# Patient Record
Sex: MALE | Race: WHITE | NOT HISPANIC OR LATINO | Employment: FULL TIME | URBAN - METROPOLITAN AREA
[De-identification: names, ages, dates, MRNs, and addresses within clinical notes are randomized per-mention and may not be internally consistent; named-entity substitution may affect disease eponyms.]

---

## 2017-02-02 ENCOUNTER — PRE VISIT (OUTPATIENT)
Dept: GASTROENTEROLOGY | Facility: CLINIC | Age: 67
End: 2017-02-02

## 2017-02-02 NOTE — TELEPHONE ENCOUNTER
1.  Date/reason for appt: 2/6/17 11:20AM Digestive Issues   2.  Referring provider: Prabhakar Bernal MD   3.  Call to patient (Yes / No - short description): Yes, LM for pt to return my call if there are outside recs.  4.  Previous care at / records requested from:  [facility] - 10/28/16  PACS - XR Chest 2 VW 10/28/16

## 2017-02-06 ENCOUNTER — OFFICE VISIT (OUTPATIENT)
Dept: GASTROENTEROLOGY | Facility: CLINIC | Age: 67
End: 2017-02-06

## 2017-02-06 VITALS
WEIGHT: 143 LBS | SYSTOLIC BLOOD PRESSURE: 121 MMHG | DIASTOLIC BLOOD PRESSURE: 84 MMHG | HEART RATE: 97 BPM | BODY MASS INDEX: 22.44 KG/M2 | HEIGHT: 67 IN | OXYGEN SATURATION: 98 %

## 2017-02-06 DIAGNOSIS — K59.00 CONSTIPATION, UNSPECIFIED CONSTIPATION TYPE: Primary | ICD-10-CM

## 2017-02-06 ASSESSMENT — ENCOUNTER SYMPTOMS
JAUNDICE: 0
ABDOMINAL PAIN: 0
RECTAL BLEEDING: 0
BLOOD IN STOOL: 0
DIARRHEA: 0
RECTAL PAIN: 0
VOMITING: 0
BLOATING: 1
NAUSEA: 0
HEARTBURN: 0
CONSTIPATION: 1

## 2017-02-06 ASSESSMENT — PAIN SCALES - GENERAL: PAINLEVEL: NO PAIN (0)

## 2017-02-06 NOTE — MR AVS SNAPSHOT
After Visit Summary   2/6/2017    Maite Hale    MRN: [id number]           Patient Information     Date Of Birth          1950        Visit Information        Provider Department      2/6/2017 11:20 AM Leonard Thakkar MD [facility] Gastroenterology and IBD        Care Instructions    1. Give a trial to fiber powder. Citrucel is a good option but metamucil is ok too. Take 1 tablespoon daily. You can increase this to 2-3 tablespoons daily    2. Continue your probiotic. When it runs out you can try Culturelle or Align. You can get them over the counter.    3. Keep a food and symptom diary for 2 weeks to help identify a food trigger for symptoms    4. When traveling you can take miralax (laxative) to help when you get really constipated. You can take this on regular basis to to help you have 1-2 BMs per day    5. We will look for information on Kegel exercises and send to you on mychart    Follow up in 4-5 months or sooner if needed        Follow-ups after your visit        Your next 10 appointments already scheduled     Feb 13, 2017  3:30 PM   Office Visit with Prabhakar Bernal MD   [facility] ([facility])    [address] [place] [id number] [phone]           Bring a current list of meds and any records pertaining to this visit.  For Physicals, please bring immunization records and any forms needing to be filled out.  Please arrive 10 minutes early to complete paperwork.            May 09, 2017  8:40 AM   (Arrive by 8:25 AM)   Return Visit with Leonard Thakkar MD   [facility] ([facility])    [address] [place] [id number] [phone]              Who to contact     Please call your clinic at [phone] to:    Ask questions about your health    Make or cancel appointments    Discuss your medicines    Learn about your test results    Speak to your  "doctor   If you have compliments or concerns about an experience at your clinic, or if you wish to file a complaint, please contact [facility] Patient Relations at [phone] or email us at [email or web address]         Additional Information About Your Visit        MyChart Information     L[employer] gives you secure access to your electronic health record. If you see a primary care provider, you can also send messages to your care team and make appointments. If you have questions, please call your primary care clinic.  If you do not have a primary care provider, please call [phone] and they will assist you.      [employer] is an electronic gateway that provides easy, online access to your medical records. With [employer], you can request a clinic appointment, read your test results, renew a prescription or communicate with your care team.     To access your existing account, please contact your [facility] or call [phone] for assistance.        Care EveryWhere ID     This is your Care EveryWhere ID. This could be used by other organizations to access your [facility] medical records  [id number]        Your Vitals Were     Pulse Height BMI (Body Mass Index) Pulse Oximetry          97 1.702 m (5' 7\") 22.39 kg/m2 98%         Blood Pressure from Last 3 Encounters:   02/06/17 121/84   10/28/16 118/76   10/10/16 127/63    Weight from Last 3 Encounters:   02/06/17 64.864 kg (143 lb)   10/28/16 64.411 kg (142 lb)   10/10/16 65.363 kg (144 lb 1.6 oz)              Today, you had the following     No orders found for display       Primary Care Provider Office Phone # Fax #    [facility] Juan A Bernal [phone]       [facility] [address]  [facility] [address]        Thank you!     Thank you for choosing [facility]  for your care. Our goal is always to provide you with excellent care. " Hearing back from our patients is one way we can continue to improve our services. Please take a few minutes to complete the written survey that you may receive in the mail after your visit with us. Thank you!             Your Updated Medication List - Protect others around you: Learn how to safely use, store and throw away your medicines at www.disposemymeds.org.          This list is accurate as of: 2/6/17 12:21 PM.  Always use your most recent med list.                   Brand Name Dispense Instructions for use    MULTI COMPLETE PO      Take 1 capsule by mouth daily       SOLUBLE FIBER/PROBIOTICS PO      Take 1 capful by mouth daily

## 2017-02-06 NOTE — NURSING NOTE
"Chief Complaint   Patient presents with     Consult     Chronic constipation, bloating.       Filed Vitals:    02/06/17 1134   BP: 121/84   Pulse: 97   Height: 1.702 m (5' 7\")   Weight: 64.864 kg (143 lb)   SpO2: 98%       Body mass index is 22.39 kg/(m^2).                              "

## 2017-02-06 NOTE — PATIENT INSTRUCTIONS
1. Give a trial to fiber powder. Citrucel is a good option but metamucil is ok too. Take 1 tablespoon daily. You can increase this to 2-3 tablespoons daily    2. Continue your probiotic. When it runs out you can try Culturelle or Align. You can get them over the counter.    3. Keep a food and symptom diary for 2 weeks to help identify a food trigger for symptoms    4. When traveling you can take miralax (laxative) to help when you get really constipated. You can take this on regular basis to to help you have 1-2 BMs per day    5. We will look for information on Kegel exercises and send to you on shoplyt    Follow up in 4-5 months or sooner if needed

## 2017-02-06 NOTE — LETTER
2/6/2017       RE: Maite Hale  [address] [place] [address]     Dear Colleague,    Thank you for referring your patient, Maite Hale, to the [facility] GASTRO[facility]OLOGY AND IBD at [facility]. Please see a copy of my visit note below.    OUTPATIENT GI CONSULT NOTE      REFERRING PROVIDER:  Prabhakar Bernal.      REASON FOR CONSULTATION:  Constipation and bloating.      CHIEF COMPLAINT:  The same.      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      The patient notes that for many years he has had constipation, and in general this has been well managed by taking prunes in the morning.  He has noticed over the last several months, and particularly while traveling, that he has some worsening constipation.  He notes particularly that any time he gets off his schedule when he travels, the constipation becomes more significant.  He has had to take Ex-Lax occasionally, and with this will have some good results.  He notes that sometimes he has small amounts of more looser stool that just feels like things are note evacuating completely, and some days he just stops them completely, and will go multiple days without having a bowel movement  (usually when traveling).  He notes that this last fall, he had bronchitis and an upper respiratory tract infection, and was treated with some antibiotics and a probiotic.  While on the antibiotics, he actually had very gratifying bowel movements, and his bloating was much improved, although he has no significant risk factors for small intestinal bacterial overgrowth such as diabetes or surgeries on his GI tract.      The patient's weight has been stable.  He has no fevers, chills or sweats.  He has no blood in his stool.   He has no rashes, joint pains, mouth sores, eye pain or eye redness.      The patient does note that about twice a month he may get some symptoms of rectal spasms that wake him from sleep.  This will last for several minutes and then resolve.      REVIEW OF SYSTEMS:  A complete review of systems is performed.  Pertinent positives and negatives as stated above in the HPI.  The remainder of a complete review of systems is unremarkable.      PAST MEDICAL HISTORY:   1.  History of vitamin D deficiency.    2.  History of dyslipidemia.   3.  Chalazion of the upper eyelid.      PAST SURGICAL HISTORY:  He has a history of mesh placement and an inguinal hernia repair in his 20s.      FAMILY HISTORY:  Brother with a history of macular degeneration.  No family history of colon cancer or prostate cancer.  No history of colon polyps that he is aware of.  His father had diabetes and hypertension.  Sister, maternal grandmother and mother had Alzheimer's disease.  Father had cerebrovascular disease.  No history of Crohn's disease or ulcerative colitis.      SOCIAL HISTORY:  He is a professor at the [employer].  He does not smoke.  He does not drink alcohol.  He uses no other illicit drugs.  He is , and his wife is living in [place] for a year.      MEDICATIONS:  A multivitamin and probiotic.      DIET:  He is mainly a vegetarian, but eats rare white and red meat.  He usually eats a lot of the yogurt.  Typically, his wife makes the yogurt, but she has been living in [place], so he buys yogurt at the store.  He does eat quite a bit of feta cheese.      PHYSICAL EXAMINATION:   VITAL SIGNS:  Weight 143 pounds, height 5 feet 7 inches, blood pressure 121/84, pulse 98.7, satting 98% on room air.   GENERAL:  He is pleasant, in no acute distress.   HEENT:  Head is atraumatic, normocephalic.  Sclerae are anicteric without injection.  Oropharynx is clear with moist mucous membranes.   NECK:  Supple, with no  lymphadenopathy.   LUNGS:  He is breathing comfortably.   HEART:  Normal rate.   ABDOMEN:  Soft, nontender and nondistended, no rebound or guarding.   EXTREMITIES:  No clubbing, cyanosis or edema.   SKIN:  No evidence of rash.   JOINTS:  No evidence of synovitis.   NEUROLOGIC:  Awake, alert and oriented x3 with no focal deficits.      LABORATORY DATA:  Reviewed in Epic.  Basic metabolic panel, LFTs, and CBC with diff are normal.  Vitamin D is normal.  Vitamin B12 is normal.  TSH has been mildly elevated, but with a normal free T4.  PSA has been normal.  CRP was checked when he was having bronchitis.  It was mildly elevated at 10.4.      COLONOSCOPY:  He had a colonoscopy in 2012 by Dr. Velasco that showed an enlarged prostate and diverticulosis in the sigmoid colon, otherwise unremarkable.      ASSESSMENT AND PLAN:   Geoffrey is a pleasant gentleman with a several year history of constipation.     1.  Constipation.   Geoffrey is here today to discuss these symptoms.  He has done a good job of trying to keep fiber in his diet.  We did spend some time discussing the difference between soluble and insoluble fiber.  He is doing a good job of getting the insoluble fiber in his diet, but sometimes this is not enough.  I recommend that he take some soluble fiber in Citrucel or Metamucil.  He can start with 1 tablespoon a day in a glass of water.  He can increase this up to 3 tablespoons daily.  He can continue his probiotic.  He is taking a relatively expensive one.  Once this is done, he can switch over to Culturelle or Align.  We also talked about using MiraLax.  This is a better and safer option than using Ex-Lax.  He can take this as needed when he has not had a bowel movement for a day or two.  I definitely recommend that he take this when he travels (and any change from his normal routine) that results in worsening constipation.  He did have a question about why it seems that some of this is getting  worse over the course of the last year or 2.  I do not know if I have a great explanation for this, but we do know that as people get older sometimes their motility wanes, and they can get more constipated.  He has had a colonoscopy in , and I do not think we need to do this again in the absence of any other red flags.  I recommend that he continue taking the prunes in the morning, and sometimes it just takes a little more fiber and some MiraLax every once in a while.  I do note that sometimes he has difficulty with complete evacuation.  If we cannot manage this with fiber, miralax and probiotics, then I will consider sending [facility] to make sure there is no pelvic floor dysfunction that could benefit from biofeedback or physical therapy.  We will also look to get him some explanations for Kegel exercises.  We will talk with Maryuri Christine when she comes to the clinic later this week to see if she has any handouts that we can send him.  The patient has been checked with celiac blood work in the past, and we do not need to do this again.  He will keep a food and symptom diary to see if he can identify any food triggers, and he will avoid any food triggers that he identifies.  He can work to avoid milk products to see if this helps change his symptoms as well.  I recommend that he continue to be hydrated, and continue his regular exercise.      The patient will follow up in 4-5 months or sooner as needed.  He will let us know if things change.  We will see how things are going at that time, and make further recommendations as needed.  He can give us a call in the meantime if things are getting worse or not improving with our recommendations.      cc: Prabhakar Bernal MD           D: 2017 12:51   T: 2017 14:33   MT: MAYNOR      Name:     LOU ESTRADA   MRN:      [id number]        Account:      [id number]   :      1950           Service Date: 2017      Document:  [id number]      Note dictated. Job code [id number].    Leonard Thakkar MD    [facility]  Division of Gastroenterology, Hepatology and Nutrition

## 2017-02-06 NOTE — PROGRESS NOTES
OUTPATIENT GI CONSULT NOTE      REFERRING PROVIDER:  Prabhakar Bernal.      REASON FOR CONSULTATION:  Constipation and bloating.      CHIEF COMPLAINT:  The same.      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      The patient notes that for many years he has had constipation, and in general this has been well managed by taking prunes in the morning.  He has noticed over the last several months, and particularly while traveling, that he has some worsening constipation.  He notes particularly that any time he gets off his schedule when he travels, the constipation becomes more significant.  He has had to take Ex-Lax occasionally, and with this will have some good results.  He notes that sometimes he has small amounts of more looser stool that just feels like things are note evacuating completely, and some days he just stops them completely, and will go multiple days without having a bowel movement  (usually when traveling).  He notes that this last fall, he had bronchitis and an upper respiratory tract infection, and was treated with some antibiotics and a probiotic.  While on the antibiotics, he actually had very gratifying bowel movements, and his bloating was much improved, although he has no significant risk factors for small intestinal bacterial overgrowth such as diabetes or surgeries on his GI tract.      The patient's weight has been stable.  He has no fevers, chills or sweats.  He has no blood in his stool.  He has no rashes, joint pains, mouth sores, eye pain or eye redness.      The patient does note that about twice a month he may get some symptoms of rectal spasms that wake him from sleep.  This will last for several minutes and then resolve.      REVIEW OF SYSTEMS:  A complete  review of systems is performed.  Pertinent positives and negatives as stated above in the HPI.  The remainder of a complete review of systems is unremarkable.      PAST MEDICAL HISTORY:   1.  History of vitamin D deficiency.    2.  History of dyslipidemia.   3.  Chalazion of the upper eyelid.      PAST SURGICAL HISTORY:  He has a history of mesh placement and an inguinal hernia repair in his 20s.      FAMILY HISTORY:  Brother with a history of macular degeneration.  No family history of colon cancer or prostate cancer.  No history of colon polyps that he is aware of.  His father had diabetes and hypertension.  Sister, maternal grandmother and mother had Alzheimer's disease.  Father had cerebrovascular disease.  No history of Crohn's disease or ulcerative colitis.      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  He does not drink alcohol.  He uses no other illicit drugs.  He is , and his wife is living in [place] for a year.      MEDICATIONS:  A multivitamin and probiotic.      DIET:  He is mainly a vegetarian, but eats rare white and red meat.  He usually eats a lot of the yogurt.  Typically, his wife makes the yogurt, but she has been living in [place], so he buys yogurt at the store.  He does eat quite a bit of feta cheese.      PHYSICAL EXAMINATION:   VITAL SIGNS:  Weight 143 pounds, height 5 feet 7 inches, blood pressure 121/84, pulse 98.7, satting 98% on room air.   GENERAL:  He is pleasant, in no acute distress.   HEENT:  Head is atraumatic, normocephalic.  Sclerae are anicteric without injection.  Oropharynx is clear with moist mucous membranes.   NECK:  Supple, with no lymphadenopathy.   LUNGS:  He is breathing comfortably.   HEART:  Normal rate.   ABDOMEN:  Soft, nontender and nondistended, no rebound or guarding.   EXTREMITIES:  No clubbing, cyanosis or edema.   SKIN:  No evidence of rash.   JOINTS:  No evidence of synovitis.   NEUROLOGIC:  Awake, alert  and oriented x3 with no focal deficits.      LABORATORY DATA:  Reviewed in Epic.  Basic metabolic panel, LFTs, and CBC with diff are normal.  Vitamin D is normal.  Vitamin B12 is normal.  TSH has been mildly elevated, but with a normal free T4.  PSA has been normal.  CRP was checked when he was having bronchitis.  It was mildly elevated at 10.4.      COLONOSCOPY:  He had a colonoscopy in 2012 by Dr. Velasco that showed an enlarged prostate and diverticulosis in the sigmoid colon, otherwise unremarkable.      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     1.  Constipation.   Geoffrey is here today to discuss these symptoms.  He has done a good job of trying to keep fiber in his diet.  We did spend some time discussing the difference between soluble and insoluble fiber.  He is doing a good job of getting the insoluble fiber in his diet, but sometimes this is not enough.  I recommend that he take some soluble fiber in Citrucel or Metamucil.  He can start with 1 tablespoon a day in a glass of water.  He can increase this up to 3 tablespoons daily.  He can continue his probiotic.  He is taking a relatively expensive one.  Once this is done, he can switch over to Culturelle or Align.  We also talked about using MiraLax.  This is a better and safer option than using Ex-Lax.  He can take this as needed when he has not had a bowel movement for a day or two.  I definitely recommend that he take this when he travels (and any change from his normal routine) that results in worsening constipation.  He did have a question about why it seems that some of this is getting worse over the course of the last year or 2.  I do not know if I have a great explanation for this, but we do know that as people get older sometimes their motility wanes, and they can get more constipated.  He has had a colonoscopy in 2012, and I do not think we need to do this again in the  absence of any other red flags.  I recommend that he continue taking the prunes in the morning, and sometimes it just takes a little more fiber and some MiraLax every once in a while.  I do note that sometimes he has difficulty with complete evacuation.  If we cannot manage this with fiber, miralax and probiotics, then I will consider sending him to [facility] to make sure there is no pelvic floor dysfunction that could benefit from biofeedback or physical therapy.  We will also look to get him some explanations for Kegel exercises.  We will talk with Maryuri Christine when she comes to the clinic later this week to see if she has any handouts that we can send him.  The patient has been checked with celiac blood work in the past, and we do not need to do this again.  He will keep a food and symptom diary to see if he can identify any food triggers, and he will avoid any food triggers that he identifies.  He can work to avoid milk products to see if this helps change his symptoms as well.  I recommend that he continue to be hydrated, and continue his regular exercise.      The patient will follow up in 4-5 months or sooner as needed.  He will let us know if things change.  We will see how things are going at that time, and make further recommendations as needed.  He can give us a call in the meantime if things are getting worse or not improving with our recommendations.      cc: MD HUNG Lopez MD             D: 2017 12:51   T: 2017 14:33   MT: MAYNOR      Name:     LOU ESTRADA   MRN:      [id number]        Account:      [id number]   :      1950           Service Date: 2017      Document: [id number]

## 2017-02-09 ENCOUNTER — CARE COORDINATION (OUTPATIENT)
Dept: GASTROENTEROLOGY | Facility: CLINIC | Age: 67
End: 2017-02-09

## 2017-02-10 ENCOUNTER — CARE COORDINATION (OUTPATIENT)
Dept: GASTROENTEROLOGY | Facility: CLINIC | Age: 67
End: 2017-02-10

## 2017-02-10 NOTE — PROGRESS NOTES
Called patient per in basket message from Dr. Thakkar. Patient in his office in [place] so will call next week to discuss. Has increased in his vegetable and fruit consumption and is having little to no problems constipation.  Sent instructions via my chart.       Learn and do Kegel exercises:   1. Slowly tighten muscles inside the bottom as if to hold urine or gas, then relax those muscles.   2. Do this exercise a few times, multiple times each day.   3. When you sit on the toilet, do a Kegel and then relax those muscles to have a BM.

## 2017-04-18 ENCOUNTER — OFFICE VISIT (OUTPATIENT)
Dept: OPHTHALMOLOGY | Facility: CLINIC | Age: 67
End: 2017-04-18
Attending: OPHTHALMOLOGY
Payer: COMMERCIAL

## 2017-04-18 DIAGNOSIS — H26.491 PCO (POSTERIOR CAPSULE OPACIFICATION), RIGHT: Primary | ICD-10-CM

## 2017-04-18 DIAGNOSIS — Z96.1 PSEUDOPHAKIA: ICD-10-CM

## 2017-04-18 PROCEDURE — 66821 AFTER CATARACT LASER SURGERY: CPT | Mod: ZF | Performed by: OPHTHALMOLOGY

## 2017-04-18 PROCEDURE — 99213 OFFICE O/P EST LOW 20 MIN: CPT | Mod: ZF,25

## 2017-04-18 RX ORDER — PREDNISOLONE ACETATE 10 MG/ML
1 SUSPENSION/ DROPS OPHTHALMIC 4 TIMES DAILY
Qty: 1 BOTTLE | Refills: 0 | Status: SHIPPED | OUTPATIENT
Start: 2017-04-18 | End: 2019-02-18

## 2017-04-18 ASSESSMENT — VISUAL ACUITY
OD_CC+: -1
OS_CC: 20/20
OS_CC+: -1
OD_CC: 20/20
METHOD: SNELLEN - LINEAR

## 2017-04-18 ASSESSMENT — REFRACTION_WEARINGRX
OS_CYLINDER: SPHERE
OS_SPHERE: -2.25
SPECS_TYPE: SVL
OD_AXIS: 100
OD_SPHERE: -1.50
OD_CYLINDER: +0.50

## 2017-04-18 ASSESSMENT — TONOMETRY
OD_IOP_MMHG: 20
IOP_METHOD: ICARE
OS_IOP_MMHG: 20

## 2017-04-18 ASSESSMENT — SLIT LAMP EXAM - LIDS
COMMENTS: NORMAL
COMMENTS: NORMAL

## 2017-04-18 ASSESSMENT — CUP TO DISC RATIO
OD_RATIO: 0.5
OS_RATIO: 0.4

## 2017-04-18 ASSESSMENT — EXTERNAL EXAM - RIGHT EYE: OD_EXAM: NORMAL

## 2017-04-18 ASSESSMENT — EXTERNAL EXAM - LEFT EYE: OS_EXAM: NORMAL

## 2017-04-18 ASSESSMENT — CONF VISUAL FIELD
OS_NORMAL: 1
OD_NORMAL: 1

## 2017-04-18 NOTE — NURSING NOTE
Chief Complaints and History of Present Illnesses   Patient presents with     Follow Up For     RE foggy     HPI    Affected eye(s):  Right   Symptoms:     No floaters   Flashes (Comment: RE flashes several time X 5 months)   No itching   No burning      Duration:  1 year      Do you have eye pain now?:  No      Comments:  Cataract extraction with posterior chamber intraocular lens (PCIOL) 10/8/15  RE vision is foggy X 6 months  Bebe JIANG 8:46 AM April 18, 2017

## 2017-04-18 NOTE — PROGRESS NOTES
Mr. Hale is a 66 year old male who presents for follow up from cataract surgery right eye.     At last visit he noted mild haloing of lights at night. He states he noticed 5-6 months ago that there's additionally some moderate blur.    Ocular history:  Cataract extraction with posterior chamber intraocular lens (PCIOL) 10/8/15    A/P:  1. Pseudophakia, right eye     Doing well.      Measured Visual acuity is good but endorses nighttime glare issues / blure      Mild PCO of the right eye noted (more so nasally > temporally)      Discussed R/B/A of laser capsulotomy       Patient would like to proceed with YAG capsulotomy right eye - see note    Return to clinic 1-2 weeks      Kasi Kirby MD  Ophthalmology resident, PGY-3      ~~~~~~~~~~~~~~~~~~~~~~~~~~~~~~~~~~~~~~~~~~~~~~~~~~~~~~~~~~~~~~~~    Complete documentation of historical and exam elements from today's encounter can be found in the full encounter summary report (not reduplicated in this progress note). I personally obtained the chief complaint(s) and history of present illness.  I confirmed and edited as necessary the review of systems, past medical/surgical history, family history, social history, and examination findings as documented by others.  I examined the patient myself, and I personally reviewed the relevant tests, images, and reports as documented above. I formulated and edited as necessary the assessment and plan and discussed the findings and management plan with the patient and family.     I was personally present for the entirety of the in-office procedure.     Tyler Damian MD, MA  Director, Cornea & Anterior Segment  [facility] Department of Ophthalmology & Visual Neuroscience

## 2017-04-18 NOTE — MR AVS SNAPSHOT
After Visit Summary   4/18/2017    Maite Hale    MRN: [id number]           Patient Information     Date Of Birth          1950        Visit Information        Provider Department      4/18/2017 8:15 AM Tyler Damian MD [facility]        Today's Diagnoses     PCO (posterior capsule opacification), right    -  1    Pseudophakia - Both Eyes           Follow-ups after your visit        Your next 10 appointments already scheduled     May 05, 2017  8:15 AM CDT   Post-Op with Tyler Damian MD   Eye Clinic ([facility])    [address]  [address]  9th [address]  [facility] [id number] [phone]            May 09, 2017  8:40 AM CDT   (Arrive by 8:25 AM)   Return Visit with Leonrad Thakkar MD   [facility] ([facility])    [address]  4th [facility] [address] [phone]              Who to contact     Please call your clinic at [phone] to:    Ask questions about your health    Make or cancel appointments    Discuss your medicines    Learn about your test results    Speak to your doctor   If you have compliments or concerns about an experience at your clinic, or if you wish to file a complaint, please contact [facility] Physicians Patient Relations at [phone] or email us at [email or web address]         Additional Information About Your Visit        MyChart Information     [employer] gives you secure access to your electronic health record. If you see a primary care provider, you can also send messages to your care team and make appointments. If you have questions, please call your primary care clinic.  If you do not have a primary care provider, please call [phone] and they will assist you.      [employer] is an electronic gateway that provides easy, online access to your medical records. With [employer], you can request a clinic appointment, read  your test results, renew a prescription or communicate with your care team.     To access your existing account, please contact your [facility] or call [phone] for assistance.        Care EveryWhere ID     This is your Care EveryWhere ID. This could be used by other organizations to access your [place] medical records  [id number]         Blood Pressure from Last 3 Encounters:   02/06/17 121/84   10/28/16 118/76   10/10/16 127/63    Weight from Last 3 Encounters:   02/06/17 64.9 kg (143 lb)   10/28/16 64.4 kg (142 lb)   10/10/16 65.4 kg (144 lb 1.6 oz)              We Performed the Following     YAG Capsulotomy OD (right eye)          Today's Medication Changes          These changes are accurate as of: 4/18/17 11:59 PM.  If you have any questions, ask your nurse or doctor.               Start taking these medicines.        Dose/Directions    prednisoLONE acetate 1 % ophthalmic susp   Commonly known as:  PRED FORTE   Used for:  PCO (posterior capsule opacification), right        Dose:  1 drop   Place 1 drop into the right eye 4 times daily   Quantity:  1 Bottle   Refills:  0            Where to get your medicines      These medications were sent to [facility] [id number] [place] [address] [address] AT [address] [place] [address] [place] [address]    Hours:  24-hours Phone:  [phone]     prednisoLONE acetate 1 % ophthalmic susp                Primary Care Provider Office Phone # Fax #    Radford Juan A Bernal [phone]       [facility] [address] [facility] [address]  [facility] [address]        Thank you!     Thank you for choosing EYE CLINIC  for your care. Our goal is always to provide you with excellent care. Hearing back from our patients is one way we can continue to improve our services. Please take a few minutes to complete the written survey that you may receive in the mail after your visit with us. Thank  you!             Your Updated Medication List - Protect others around you: Learn how to safely use, store and throw away your medicines at www.disposemymeds.org.          This list is accurate as of: 4/18/17 11:59 PM.  Always use your most recent med list.                   Brand Name Dispense Instructions for use    MULTI COMPLETE PO      Take 1 capsule by mouth daily       prednisoLONE acetate 1 % ophthalmic susp    PRED FORTE    1 Bottle    Place 1 drop into the right eye 4 times daily       SOLUBLE FIBER/PROBIOTICS PO      Take 1 capful by mouth daily

## 2017-04-26 ENCOUNTER — RX ONLY (RX ONLY)
Age: 67
End: 2017-04-26

## 2017-04-26 RX ORDER — EFINACONAZOLE 100 MG/ML
SOLUTION TOPICAL
Qty: 16 | Refills: 2 | Status: ERX

## 2017-05-05 ENCOUNTER — OFFICE VISIT (OUTPATIENT)
Dept: OPHTHALMOLOGY | Facility: CLINIC | Age: 67
End: 2017-05-05
Attending: OPHTHALMOLOGY
Payer: COMMERCIAL

## 2017-05-05 DIAGNOSIS — H26.491 PCO (POSTERIOR CAPSULE OPACIFICATION), RIGHT: ICD-10-CM

## 2017-05-05 DIAGNOSIS — Z96.1 PSEUDOPHAKIA OF RIGHT EYE: Primary | ICD-10-CM

## 2017-05-05 PROCEDURE — 99212 OFFICE O/P EST SF 10 MIN: CPT | Mod: ZF

## 2017-05-05 ASSESSMENT — REFRACTION_WEARINGRX
OS_SPHERE: -2.25
SPECS_TYPE: SVL
OD_CYLINDER: +0.50
OD_AXIS: 100
OD_SPHERE: -1.50
OS_CYLINDER: SPHERE

## 2017-05-05 ASSESSMENT — TONOMETRY
OD_IOP_MMHG: 18
OS_IOP_MMHG: 17
IOP_METHOD: TONOPEN

## 2017-05-05 ASSESSMENT — EXTERNAL EXAM - RIGHT EYE: OD_EXAM: NORMAL

## 2017-05-05 ASSESSMENT — CUP TO DISC RATIO
OS_RATIO: 0.4
OD_RATIO: 0.5

## 2017-05-05 ASSESSMENT — VISUAL ACUITY
OD_CC: J1
OS_CC: 20/20
OS_CC+: -1
OD_CC+: +2
METHOD: SNELLEN - LINEAR
OS_CC: J1
OD_CC: 20/20

## 2017-05-05 ASSESSMENT — SLIT LAMP EXAM - LIDS
COMMENTS: NORMAL
COMMENTS: NORMAL

## 2017-05-05 ASSESSMENT — CONF VISUAL FIELD
OS_NORMAL: 1
OD_NORMAL: 1

## 2017-05-05 ASSESSMENT — EXTERNAL EXAM - LEFT EYE: OS_EXAM: NORMAL

## 2017-05-05 NOTE — NURSING NOTE
Chief Complaints and History of Present Illnesses   Patient presents with     Follow Up For     s/p PCO (posterior capsule opacification), right (Primary      HPI    Affected eye(s):  Both   Symptoms:     Blurred vision   Decreased vision   No floaters   No flashes   No Dryness      Duration:  2 weeks   Frequency:  Constant       Do you have eye pain now?:  No      Comments:  Pt stated fuzzy vision RE over the last 2 weeks.    Isacc Velasquez  8:46 AM May 5, 2017

## 2017-05-05 NOTE — MR AVS SNAPSHOT
After Visit Summary   5/5/2017    Maite Hale    MRN: [id number]           Patient Information     Date Of Birth          1950        Visit Information        Provider Department      5/5/2017 8:15 AM Tyler Damian MD Eye Clinic        Today's Diagnoses     Pseudophakia of right eye    -  1    PCO (posterior capsule opacification), right           Follow-ups after your visit        Who to contact     Please call your clinic at [phone] to:    Ask questions about your health    Make or cancel appointments    Discuss your medicines    Learn about your test results    Speak to your doctor   If you have compliments or concerns about an experience at your clinic, or if you wish to file a complaint, please contact [facility] Phys[facility] Patient Relations at [phone] or email us at [email or web address]         Additional Information About Your Visit        MyChart Information     [employer] gives you secure access to your electronic health record. If you see a primary care provider, you can also send messages to your care team and make appointments. If you have questions, please call your primary care clinic.  If you do not have a primary care provider, please call [phone] and they will assist you.      [employer] is an electronic gateway that provides easy, online access to your medical records. With [employer], you can request a clinic appointment, read your test results, renew a prescription or communicate with your care team.     To access your existing account, please contact your [facility] Clinic or call [phone] for assistance.        Care EveryWhere ID     This is your Care EveryWhere ID. This could be used by other organizations to access your [place] medical records  [id number]         Blood Pressure from Last 3 Encounters:   02/06/17 121/84   10/28/16 118/76   10/10/16 127/63    Weight from Last 3 Encounters:   02/06/17  64.9 kg (143 lb)   10/28/16 64.4 kg (142 lb)   10/10/16 65.4 kg (144 lb 1.6 oz)              Today, you had the following     No orders found for display       Primary Care Provider Office Phone # Fax #    Prabhakar Velazco Alexandru Bernal [phone]       [facility] [address] [facility] [address]  [place] [address]        Thank you!     Thank you for choosing EYE CLINIC  for your care. Our goal is always to provide you with excellent care. Hearing back from our patients is one way we can continue to improve our services. Please take a few minutes to complete the written survey that you may receive in the mail after your visit with us. Thank you!             Your Updated Medication List - Protect others around you: Learn how to safely use, store and throw away your medicines at www.disposemymeds.org.          This list is accurate as of: 5/5/17 11:59 PM.  Always use your most recent med list.                   Brand Name Dispense Instructions for use    MULTI COMPLETE PO      Take 1 capsule by mouth daily       prednisoLONE acetate 1 % ophthalmic susp    PRED FORTE    1 Bottle    Place 1 drop into the right eye 4 times daily       SOLUBLE FIBER/PROBIOTICS PO      Take 1 capful by mouth daily

## 2017-06-14 ENCOUNTER — APPOINTMENT (OUTPATIENT)
Age: 67
Setting detail: DERMATOLOGY
End: 2017-06-15

## 2017-06-14 DIAGNOSIS — B35.1 TINEA UNGUIUM: ICD-10-CM

## 2017-06-14 PROCEDURE — 99213 OFFICE O/P EST LOW 20 MIN: CPT

## 2017-06-14 PROCEDURE — OTHER COUNSELING: OTHER

## 2017-06-14 PROCEDURE — OTHER MIPS QUALITY: OTHER

## 2017-06-14 PROCEDURE — OTHER TREATMENT REGIMEN: OTHER

## 2017-06-14 ASSESSMENT — LOCATION ZONE DERM: LOCATION ZONE: TOENAIL

## 2017-06-14 ASSESSMENT — LOCATION SIMPLE DESCRIPTION DERM
LOCATION SIMPLE: LEFT GREAT TOE
LOCATION SIMPLE: RIGHT GREAT TOE

## 2017-06-14 ASSESSMENT — LOCATION DETAILED DESCRIPTION DERM
LOCATION DETAILED: LEFT GREAT TOENAIL
LOCATION DETAILED: RIGHT GREAT TOENAIL

## 2017-06-14 NOTE — PROCEDURE: TREATMENT REGIMEN
Continue Regimen: Jublia solution QD-BID to affected toenails.  ***Abimael will call when he needs refills sent to [facility] in [place], [place]; Will plan to dispense 2 bottles at a time going forward*** Continue Regimen: Jublia solution QD-BID to affected toenails.  ***Abimael will call when he needs refills sent to [facility] in [place], [place]; Will plan to dispense 2 bottles at a time going forward***

## 2017-06-14 NOTE — PROCEDURE: MIPS QUALITY
Quality 431: Preventive Care And Screening: Unhealthy Alcohol Use - Screening: Patient screened for unhealthy alcohol use using a single question and scores less than 2 times per year
Detail Level: Detailed
Quality 131: Pain Assessment And Follow-Up: Pain assessment using a standardized tool is documented as negative, no follow-up plan required
Quality 130: Documentation Of Current Medications In The Medical Record: Current Medications Documented
Quality 110: Preventive Care And Screening: Influenza Immunization: Influenza Immunization Administered during Influenza season
Quality 226: Preventive Care And Screening: Tobacco Use: Screening And Cessation Intervention: Patient screened for tobacco and never smoked

## 2017-06-20 ENCOUNTER — TELEPHONE (OUTPATIENT)
Dept: INTERNAL MEDICINE | Facility: CLINIC | Age: 67
End: 2017-06-20

## 2017-06-20 NOTE — TELEPHONE ENCOUNTER
"Spoke with pt, appt scheduled.  Denia Wiggins RN      Message left for pt to call back  Denia Wiggins RN  ----------------------        Denia- can you approve him to return to my patient panel? Estevan Lackey    ---------- Forwarded message ---------  From: <[email or web address]>  Date: Mon, Jun 19, 2017 at 4:58 PM  Subject:   To:       Pagecopy - From:[id number]@[email or web address] hospitalScandid  Msg:Michael Daigle: This is Maite Hale, your old patient and your [facility] colleague. I would like to return to you as my primary physician but I am considered \"n  --   Sent from iPad          ----- Message from Kathleen Howard RN sent at 6/19/2017  4:35 PM CDT -----  Regarding: FW: Former Pt of Wilian Daigle would like to reestablish with him again  Contact: [phone]      ----- Message -----     From: Morales Younger     Sent: 6/19/2017   4:26 PM       To: [facility] Nursing Staff-  Subject: Former Pt of Wilianneo Daigle would like to re#    Former Pt of Wilian Daigle would like to reestablish care with him again as his PCP - Would like  to make an exception - Pt said they were close so he will text the DrZenia as well [phone]      Thank You  Morales  [facility] call center    "

## 2017-10-16 ENCOUNTER — OFFICE VISIT (OUTPATIENT)
Dept: FAMILY MEDICINE | Facility: CLINIC | Age: 67
End: 2017-10-16
Payer: COMMERCIAL

## 2017-10-16 VITALS
SYSTOLIC BLOOD PRESSURE: 122 MMHG | DIASTOLIC BLOOD PRESSURE: 83 MMHG | HEART RATE: 85 BPM | WEIGHT: 141.4 LBS | TEMPERATURE: 96.9 F | HEIGHT: 66 IN | BODY MASS INDEX: 22.73 KG/M2 | OXYGEN SATURATION: 99 %

## 2017-10-16 DIAGNOSIS — Z11.59 NEED FOR HEPATITIS C SCREENING TEST: ICD-10-CM

## 2017-10-16 DIAGNOSIS — Z00.00 ROUTINE HISTORY AND PHYSICAL EXAMINATION OF ADULT: Primary | ICD-10-CM

## 2017-10-16 DIAGNOSIS — Z13.220 LIPID SCREENING: ICD-10-CM

## 2017-10-16 DIAGNOSIS — L98.9 SKIN LESION: ICD-10-CM

## 2017-10-16 DIAGNOSIS — E55.9 VITAMIN D DEFICIENCY: ICD-10-CM

## 2017-10-16 DIAGNOSIS — Z12.5 SCREENING FOR PROSTATE CANCER: ICD-10-CM

## 2017-10-16 DIAGNOSIS — Z23 NEED FOR PROPHYLACTIC VACCINATION AND INOCULATION AGAINST INFLUENZA: ICD-10-CM

## 2017-10-16 DIAGNOSIS — Z13.1 SCREENING FOR DIABETES MELLITUS: ICD-10-CM

## 2017-10-16 DIAGNOSIS — H90.3 BILATERAL SENSORINEURAL HEARING LOSS: ICD-10-CM

## 2017-10-16 DIAGNOSIS — Z13.21 ENCOUNTER FOR VITAMIN DEFICIENCY SCREENING: ICD-10-CM

## 2017-10-16 LAB
ANION GAP SERPL CALCULATED.3IONS-SCNC: 5 MMOL/L (ref 3–14)
BASOPHILS # BLD AUTO: 0 10E9/L (ref 0–0.2)
BASOPHILS NFR BLD AUTO: 0.4 %
BUN SERPL-MCNC: 14 MG/DL (ref 7–30)
CALCIUM SERPL-MCNC: 9 MG/DL (ref 8.5–10.1)
CHLORIDE SERPL-SCNC: 104 MMOL/L (ref 94–109)
CHOLEST SERPL-MCNC: 192 MG/DL
CO2 SERPL-SCNC: 29 MMOL/L (ref 20–32)
CREAT SERPL-MCNC: 0.98 MG/DL (ref 0.66–1.25)
DIFFERENTIAL METHOD BLD: NORMAL
EOSINOPHIL # BLD AUTO: 0.1 10E9/L (ref 0–0.7)
EOSINOPHIL NFR BLD AUTO: 2.6 %
ERYTHROCYTE [DISTWIDTH] IN BLOOD BY AUTOMATED COUNT: 13.6 % (ref 10–15)
FOLATE SERPL-MCNC: 33.4 NG/ML
GFR SERPL CREATININE-BSD FRML MDRD: 77 ML/MIN/1.7M2
GLUCOSE SERPL-MCNC: 88 MG/DL (ref 70–99)
HBA1C MFR BLD: 5.6 % (ref 4.3–6)
HCT VFR BLD AUTO: 45.2 % (ref 40–53)
HDLC SERPL-MCNC: 64 MG/DL
HGB BLD-MCNC: 15.1 G/DL (ref 13.3–17.7)
LDLC SERPL CALC-MCNC: 117 MG/DL
LYMPHOCYTES # BLD AUTO: 1.4 10E9/L (ref 0.8–5.3)
LYMPHOCYTES NFR BLD AUTO: 27.8 %
MCH RBC QN AUTO: 29.8 PG (ref 26.5–33)
MCHC RBC AUTO-ENTMCNC: 33.4 G/DL (ref 31.5–36.5)
MCV RBC AUTO: 89 FL (ref 78–100)
MONOCYTES # BLD AUTO: 0.4 10E9/L (ref 0–1.3)
MONOCYTES NFR BLD AUTO: 7.5 %
NEUTROPHILS # BLD AUTO: 3.1 10E9/L (ref 1.6–8.3)
NEUTROPHILS NFR BLD AUTO: 61.7 %
NONHDLC SERPL-MCNC: 128 MG/DL
PLATELET # BLD AUTO: 209 10E9/L (ref 150–450)
POTASSIUM SERPL-SCNC: 3.9 MMOL/L (ref 3.4–5.3)
PSA SERPL-ACNC: 0.92 UG/L (ref 0–4)
RBC # BLD AUTO: 5.06 10E12/L (ref 4.4–5.9)
SODIUM SERPL-SCNC: 138 MMOL/L (ref 133–144)
TRIGL SERPL-MCNC: 57 MG/DL
VIT B12 SERPL-MCNC: 474 PG/ML (ref 193–986)
WBC # BLD AUTO: 5.1 10E9/L (ref 4–11)

## 2017-10-16 PROCEDURE — 85025 COMPLETE CBC W/AUTO DIFF WBC: CPT | Performed by: INTERNAL MEDICINE

## 2017-10-16 PROCEDURE — G0103 PSA SCREENING: HCPCS | Performed by: INTERNAL MEDICINE

## 2017-10-16 PROCEDURE — 90662 IIV NO PRSV INCREASED AG IM: CPT | Performed by: INTERNAL MEDICINE

## 2017-10-16 PROCEDURE — 86803 HEPATITIS C AB TEST: CPT | Performed by: INTERNAL MEDICINE

## 2017-10-16 PROCEDURE — 80048 BASIC METABOLIC PNL TOTAL CA: CPT | Performed by: INTERNAL MEDICINE

## 2017-10-16 PROCEDURE — 36415 COLL VENOUS BLD VENIPUNCTURE: CPT | Performed by: INTERNAL MEDICINE

## 2017-10-16 PROCEDURE — 90471 IMMUNIZATION ADMIN: CPT | Performed by: INTERNAL MEDICINE

## 2017-10-16 PROCEDURE — 99397 PER PM REEVAL EST PAT 65+ YR: CPT | Mod: 25 | Performed by: INTERNAL MEDICINE

## 2017-10-16 PROCEDURE — 80061 LIPID PANEL: CPT | Performed by: INTERNAL MEDICINE

## 2017-10-16 PROCEDURE — 82306 VITAMIN D 25 HYDROXY: CPT | Performed by: INTERNAL MEDICINE

## 2017-10-16 PROCEDURE — 83036 HEMOGLOBIN GLYCOSYLATED A1C: CPT | Performed by: INTERNAL MEDICINE

## 2017-10-16 PROCEDURE — 82607 VITAMIN B-12: CPT | Performed by: INTERNAL MEDICINE

## 2017-10-16 PROCEDURE — 82746 ASSAY OF FOLIC ACID SERUM: CPT | Performed by: INTERNAL MEDICINE

## 2017-10-16 NOTE — NURSING NOTE
"Chief Complaint   Patient presents with     Wellness Visit       Initial /83 (BP Location: Left arm, Cuff Size: Adult Regular)  Pulse 85  Temp 96.9  F (36.1  C) (Oral)  Ht 5' 6.14\" (1.68 m)  Wt 141 lb 6.4 oz (64.1 kg)  SpO2 99%  BMI 22.72 kg/m2 Estimated body mass index is 22.72 kg/(m^2) as calculated from the following:    Height as of this encounter: 5' 6.14\" (1.68 m).    Weight as of this encounter: 141 lb 6.4 oz (64.1 kg).  Medication Reconciliation: complete     PETEY Ribera      "

## 2017-10-16 NOTE — MR AVS SNAPSHOT
After Visit Summary   10/16/2017    Maite Hale    MRN: [id number]           Patient Information     Date Of Birth          1950        Visit Information        Provider Department      10/16/2017 9:00 AM Anaya Martell MD [facility]        Today's Diagnoses     Routine history and physical examination of adult    -  1    Need for hepatitis C screening test        Need for prophylactic vaccination and inoculation against influenza        Screening for prostate cancer        Screening for diabetes mellitus        Lipid screening        Skin lesion        Bilateral sensorineural hearing loss        Vitamin D deficiency        Encounter for vitamin deficiency screening          Care Instructions      Preventive Health Recommendations:       Male Ages 65 and over    Yearly exam:             See your health care provider every year in order to  o   Review health changes.   o   Discuss preventive care.    o   Review your medicines if your doctor has prescribed any.    Talk with your health care provider about whether you should have a test to screen for prostate cancer (PSA).    Every 3 years, have a diabetes test (fasting glucose). If you are at risk for diabetes, you should have this test more often.    Every 5 years, have a cholesterol test. Have this test more often if you are at risk for high cholesterol or heart disease.     Every 10 years, have a colonoscopy. Or, have a yearly FIT test (stool test). These exams will check for colon cancer.    Talk to with your health care provider about screening for Abdominal Aortic Aneurysm if you have a family history of AAA or have a history of smoking.  Shots:     Get a flu shot each year.     Get a tetanus shot every 10 years.     Talk to your doctor about your pneumonia vaccines. There are now two you should receive - Pneumovax (PPSV 23) and Prevnar (PCV 13).    Talk to your doctor about a shingles vaccine.     Talk to your doctor about  the hepatitis B vaccine.  Nutrition:     Eat at least 5 servings of fruits and vegetables each day.     Eat whole-grain bread, whole-wheat pasta and brown rice instead of white grains and rice.     Talk to your doctor about Calcium and Vitamin D.   Lifestyle    Exercise for at least 150 minutes a week (30 minutes a day, 5 days a week). This will help you control your weight and prevent disease.     Limit alcohol to one drink per day.     No smoking.     Wear sunscreen to prevent skin cancer.     See your dentist every six months for an exam and cleaning.     See your eye doctor every 1 to 2 years to screen for conditions such as glaucoma, macular degeneration and cataracts.          Follow-ups after your visit        Additional Services     AUDIOLOGY ADULT REFERRAL       Your provider has referred you to: [facility]: [facility] - [place] [phone]   [email or web address]    Specialty Testing:  Audiogram w/Tymps and Reflexes (Comprehensive Audiology Evaluation)            DERMATOLOGY REFERRAL       Your provider has referred you to: [facility]: [facility]  [place] [phone]   [email or web address]    Please be aware that coverage of these services is subject to the terms and limitations of your health insurance plan.  Call member services at your health plan with any benefit or coverage questions.      Please bring the following with you to your appointment:    (1) Any X-Rays, CTs or MRIs which have been performed.  Contact the facility where they were done to arrange for  prior to your scheduled appointment.    (2) List of current medications  (3) This referral request   (4) Any documents/labs given to you for this referral            DERMATOLOGY REFERRAL       Your provider has referred you to: [facility]: [facility] [phone]    [email or web address]      Please be aware that coverage of these services is subject to the terms and limitations of your health insurance plan.  Call member services at your health plan with any benefit or coverage questions.      Please bring the following with you to your appointment:    (1) Any X-Rays, CTs or MRIs which have been performed.  Contact the facility where they were done to arrange for  prior to your scheduled appointment.    (2) List of current medications  (3) This referral request   (4) Any documents/labs given to you for this referral            DERMATOLOGY REFERRAL       Your provider has referred you to: [facility]: Dermatology [facility] - [place] [phone]   [email or web address]  N: [facility] [phone]   [email or web address]    Please be aware that coverage of these services is subject to the terms and limitations of your health insurance plan.  Call member services at your health plan with any benefit or coverage questions.      Please bring the following with you to your appointment:    (1) Any X-Rays, CTs or MRIs which have been performed.  Contact the facility where they were done to arrange for  prior to your scheduled appointment.    (2) List of current medications  (3) This referral request   (4) Any documents/labs given to you for this referral                  Follow-up notes from your care team     Return in about 1 year (around 10/16/2018).      Your next 10 appointments already scheduled     Dec 06, 2017  3:25 PM CST   (Arrive by 3:10 PM)   Return Visit with Wilian Daigle MD   [facility] ([facility])    [address] [place] [address]  [address] [facility] [address] [phone]              Who to contact     If you have questions or need follow up information about today's clinic visit or your schedule please contact  "[facility] directly at [phone].  Normal or non-critical lab and imaging results will be communicated to you by MyChart, letter or phone within 4 business days after the clinic has received the results. If you do not hear from us within 7 days, please contact the clinic through [employer] or phone. If you have a critical or abnormal lab result, we will notify you by phone as soon as possible.  Submit refill requests through [employer] or call your pharmacy and they will forward the refill request to us. Please allow 3 business days for your refill to be completed.          Additional Information About Your Visit        VentarioharQwilr Information     [employer] gives you secure access to your electronic health record. If you see a primary care provider, you can also send messages to your care team and make appointments. If you have questions, please call your primary care clinic.  If you do not have a primary care provider, please call [phone] and they will assist you.        Care EveryWhere ID     This is your Care EveryWhere ID. This could be used by other organizations to access your [place] medical records  [id number]        Your Vitals Were     Pulse Temperature Height Pulse Oximetry BMI (Body Mass Index)       85 96.9  F (36.1  C) (Oral) 5' 6.14\" (1.68 m) 99% 22.72 kg/m2        Blood Pressure from Last 3 Encounters:   10/16/17 122/83   02/06/17 121/84   10/28/16 118/76    Weight from Last 3 Encounters:   10/16/17 141 lb 6.4 oz (64.1 kg)   02/06/17 143 lb (64.9 kg)   10/28/16 142 lb (64.4 kg)              We Performed the Following     AUDIOLOGY ADULT REFERRAL     Basic metabolic panel  (Ca, Cl, CO2, Creat, Gluc, K, Na, BUN)     CBC with platelets and differential     DERMATOLOGY REFERRAL     DERMATOLOGY REFERRAL     DERMATOLOGY REFERRAL     Folate     Hemoglobin A1c     Hepatitis C Screen Reflex to HCV RNA Quant and Genotype     Lipid panel reflex to direct LDL     PSA, screen     Vitamin B12     " Vitamin D Deficiency        Primary Care Provider Office Phone # Fax #    Anaya Christopher Martell [phone]       [place] [address] [facility] [address]  [facility] [address]        Equal Access to Services     CARMENZA LAUREN : Hadii zeb ku hadabdelrahmano Soomaali, waaxda luqadaha, qaybta kaalmada adeegyada, waxanne ayush shaguftan deanne mcdonoughmehreensavannah whelan. So [facility] [phone].    ATENCIÓN: Si habla español, tiene a noel disposición servicios gratuitos de asistencia lingüística. Llame al [phone].    We comply with applicable federal civil rights laws and [place] laws. We do not discriminate on the basis of race, color, national origin, age, disability, sex, sexual orientation, or gender identity.            Thank you!     Thank you for choosing [facility]  for your care. Our goal is always to provide you with excellent care. Hearing back from our patients is one way we can continue to improve our services. Please take a few minutes to complete the written survey that you may receive in the mail after your visit with us. Thank you!             Your Updated Medication List - Protect others around you: Learn how to safely use, store and throw away your medicines at www.disposemymeds.org.          This list is accurate as of: 10/16/17  9:58 AM.  Always use your most recent med list.                   Brand Name Dispense Instructions for use Diagnosis    MULTI COMPLETE PO      Take 1 capsule by mouth daily        prednisoLONE acetate 1 % ophthalmic susp    PRED FORTE    1 Bottle    Place 1 drop into the right eye 4 times daily    PCO (posterior capsule opacification), right       SOLUBLE FIBER/PROBIOTICS PO      Take 1 capful by mouth daily

## 2017-10-16 NOTE — PROGRESS NOTES
SUBJECTIVE:   Maite Hale is a 67 year old male who presents for Preventive Visit.    Are you in the first 12 months of your Medicare Part B coverage?  No    Healthy Habits:    Do you get at least three servings of calcium containing foods daily (dairy, green leafy vegetables, etc.)? yes    Amount of exercise or daily activities, outside of work: 3-4 day(s) per week    Problems taking medications regularly Not applicable    Medication side effects: No    Have you had an eye exam in the past two years? yes    Do you see a dentist twice per year? yes    Do you have sleep apnea, excessive snoring or daytime drowsiness?no    COGNITIVE SCREEN  1) Repeat 3 items (Banana, Sunrise, Chair)    2) Clock draw: NORMAL  3) 3 item recall: Recalls 2 objects   Results: NORMAL clock, 1-2 items recalled: COGNITIVE IMPAIRMENT LESS LIKELY    Mini-CogTM Copyright S Natalie. Licensed by the author for use in [facility]; reprinted with permission (obie@[facility]). All rights reserved.        Reviewed and updated as needed this visit by clinical staff         Reviewed and updated as needed this visit by Provider      Social History   Substance Use Topics     Smoking status: Never Smoker     Smokeless tobacco: Never Used     Alcohol use 0.0 oz/week     0 Standard drinks or equivalent per week      Comment: occasionally       The patient does not drink >3 drinks per day nor >7 drinks per week.    Today's PHQ-2 Score:   PHQ-2 ( 1999 [employer]) 10/16/2017 10/28/2016   Q1: Little interest or pleasure in doing things 0 0   Q2: Feeling down, depressed or hopeless 0 0   PHQ-2 Score 0 0   Q1: Little interest or pleasure in doing things - -   Q2: Feeling down, depressed or hopeless - -   PHQ-2 Score - -         Do you feel safe in your environment - Yes    Do you have a Health Care Directive?: No: Advance care planning was reviewed with patient; patient declined at this time.      Current providers sharing in care for this patient include:  "Patient Care Team:  Prabhakar Bernal MD as PCP - General (Internal Medicine)  Tyler Damian MD as MD (Ophthalmology)  Brittney Mckeon MD as MD (Ophthalmology)      Hearing impairment: No    Ability to successfully perform activities of daily living: Yes, no assistance needed     Fall risk:  Fallen 2 or more times in the past year?: No  Any fall with injury in the past year?: No      Home safety:  none identified      The following health maintenance items are reviewed in Epic and correct as of today:Health Maintenance   Topic Date Due     HEPATITIS C SCREENING  09/11/1969     PNEUMOCOCCAL (1 of 2 - PCV13) 09/11/2016     AORTIC ANEURYSM SCREENING (SYSTEM ASSIGNED)  09/11/2016     INFLUENZA VACCINE (SYSTEM ASSIGNED)  09/01/2017     ADVANCE DIRECTIVE PLANNING Q5 YRS  10/26/2017     FALL RISK ASSESSMENT  10/28/2017     TETANUS IMMUNIZATION (SYSTEM ASSIGNED)  05/04/2019     LIPID SCREEN Q5 YR MALE (SYSTEM ASSIGNED)  04/11/2021     COLONOSCOPY Q10 YR  07/12/2022     Labs reviewed in EPIC      ROS:  Constitutional, HEENT, cardiovascular, pulmonary, GI, , musculoskeletal, neuro, skin, endocrine and psych systems are negative, except as otherwise noted.      OBJECTIVE:   /83 (BP Location: Left arm, Cuff Size: Adult Regular)  Pulse 85  Temp 96.9  F (36.1  C) (Oral)  Ht 5' 6.14\" (1.68 m)  Wt 141 lb 6.4 oz (64.1 kg)  SpO2 99%  BMI 22.72 kg/m2 Estimated body mass index is 22.4 kg/(m^2) as calculated from the following:    Height as of 2/6/17: 5' 7\" (1.702 m).    Weight as of 2/6/17: 143 lb (64.9 kg).  EXAM:   GENERAL: healthy, alert and no distress  EYES: Eyes grossly normal to inspection, PERRL and conjunctivae and sclerae normal  HENT: ear canals and TM's normal, nose and mouth without ulcers or lesions  NECK: no adenopathy, no asymmetry, masses, or scars and thyroid normal to palpation  RESP: lungs clear to auscultation - no rales, rhonchi or wheezes  CV: regular rate and rhythm, " normal S1 S2, no S3 or S4, no murmur, click or rub, no peripheral edema and peripheral pulses strong  ABDOMEN: soft, nontender, no hepatosplenomegaly, no masses and bowel sounds normal  MS: no gross musculoskeletal defects noted, no edema  SKIN: multiple skin lesions noted of the face, neck and torso  NEURO: Normal strength and tone, mentation intact and speech normal  PSYCH: mentation appears normal, affect normal/bright    ASSESSMENT / PLAN:   (Z00.00) Routine history and physical examination of adult  (primary encounter diagnosis)  Comment: Yearly physical exam today.  Plan: I have ordered labs for CBC with platelets and differential, Basic metabolic panel  (Ca, Cl, CO2, Creat, Gluc, K, Na, BUN) today.      (Z11.59) Need for hepatitis C screening test  Comment: Patient is due for routine Hep C screening.  Plan: I have ordered lab for Hepatitis C Screen Reflex to HCV RNA Quant and Genotype today.      (Z23) Need for prophylactic vaccination and inoculation against influenza  Comment: Patient is due for flu vaccine.  Plan: I have ordered lab for FLU VACCINE, INCREASED ANTIGEN, PRESV FREE, AGE 65+ [46321], Vaccine Administration, Initial [69427] in clinic today.      (Z12.5) Screening for prostate cancer  Comment: Patient is due for PSA lab for prostate cancer screening.  Plan: I have ordered lab for PSA, screen today.      (Z13.1) Screening for diabetes mellitus  Comment: Patient is due for diabetes screening.  Plan: I have ordered lab for Hemoglobin A1c.      (Z13.220) Lipid screening  Comment: Patient is due for lipid screening.  Plan: I have ordered lab for Lipid panel reflex to direct LDL.      (L98.9) Skin lesion  Comment: multiple skin lesions noted of the face, neck and torso  Plan: I have ordered DERMATOLOGY REFERRAL for further evaluation and management going forward.      (H90.3) Bilateral sensorineural hearing loss  Comment: chronic bilateral hearing loss.  Plan: I have ordered AUDIOLOGY ADULT REFERRAL  "for further evaluation and management going forward.            (E55.9) Vitamin D deficiency  Comment: Patient is due for repeat Vitamin D lab for monitoring of vitamin D deficiency.  Plan: I have ordered lab for Vitamin D Deficiency today.      (Z13.21) Encounter for vitamin deficiency screening  Comment: Patient is requesting vitamin b12 deficiency screening.  Plan: I have ordered lab for Vitamin B12, Folate today.          COUNSELING:  Reviewed preventive health counseling, as reflected in patient instructions  Special attention given to:       Regular exercise       Healthy diet/nutrition        Estimated body mass index is 22.4 kg/(m^2) as calculated from the following:    Height as of 2/6/17: 5' 7\" (1.702 m).    Weight as of 2/6/17: 143 lb (64.9 kg).       reports that he has never smoked. He has never used smokeless tobacco.    Appropriate preventive services were discussed with this patient, including applicable screening as appropriate for cardiovascular disease, diabetes, osteopenia/osteoporosis, and glaucoma.  As appropriate for age/gender, discussed screening for colorectal cancer, prostate cancer, breast cancer, and cervical cancer. Checklist reviewing preventive services available has been given to the patient.    Reviewed patients plan of care and provided an AVS. The Basic Care Plan (routine screening as documented in Health Maintenance) for Maite meets the Care Plan requirement. This Care Plan has been established and reviewed with the Patient.    Counseling Resources:  ATP IV Guidelines  Pooled Cohorts Equation Calculator  Breast Cancer Risk Calculator  FRAX Risk Assessment  ICSI Preventive Guidelines  Dietary Guidelines for Americans, 2010  USDA's MyPlate  ASA Prophylaxis  Lung CA Screening    Anaya Martell MD  [facility]  "

## 2017-10-16 NOTE — PROGRESS NOTES
Injectable Influenza Immunization Documentation    1.  Is the person to be vaccinated sick today?   No    2. Does the person to be vaccinated have an allergy to a component   of the vaccine?   No    3. Has the person to be vaccinated ever had a serious reaction   to influenza vaccine in the past?   No    4. Has the person to be vaccinated ever had Guillain-Barré syndrome?   No    Form completed by Patient

## 2017-10-17 LAB
DEPRECATED CALCIDIOL+CALCIFEROL SERPL-MC: 42 UG/L (ref 20–75)
HCV AB SERPL QL IA: NONREACTIVE

## 2017-10-18 NOTE — NURSING NOTE
Health care provider form completed by , ready to  with from desk staff. Patient was notified.    Jennifer Tovar CMA

## 2018-01-17 ENCOUNTER — OFFICE VISIT (OUTPATIENT)
Dept: FAMILY MEDICINE | Facility: CLINIC | Age: 68
End: 2018-01-17
Payer: COMMERCIAL

## 2018-01-17 VITALS
WEIGHT: 143.5 LBS | OXYGEN SATURATION: 100 % | HEART RATE: 92 BPM | DIASTOLIC BLOOD PRESSURE: 88 MMHG | SYSTOLIC BLOOD PRESSURE: 128 MMHG | BODY MASS INDEX: 23.06 KG/M2 | TEMPERATURE: 98.2 F | HEIGHT: 66 IN

## 2018-01-17 DIAGNOSIS — H91.90 DECREASED HEARING, UNSPECIFIED LATERALITY: ICD-10-CM

## 2018-01-17 DIAGNOSIS — Z87.898 HISTORY OF ANGIOEDEMA: ICD-10-CM

## 2018-01-17 DIAGNOSIS — B35.1 ONYCHOMYCOSIS: ICD-10-CM

## 2018-01-17 DIAGNOSIS — R79.89 ELEVATED TSH: Primary | ICD-10-CM

## 2018-01-17 DIAGNOSIS — J30.2 ACUTE SEASONAL ALLERGIC RHINITIS DUE TO OTHER ALLERGEN: ICD-10-CM

## 2018-01-17 LAB
T4 FREE SERPL-MCNC: 0.8 NG/DL (ref 0.76–1.46)
TSH SERPL DL<=0.005 MIU/L-ACNC: 4.5 MU/L (ref 0.4–4)

## 2018-01-17 NOTE — NURSING NOTE
"67 year old  Chief Complaint   Patient presents with     Establish Care     possible thyroid problem       Blood pressure 128/88, pulse 92, temperature 98.2  F (36.8  C), temperature source Oral, height 5' 5.55\" (166.5 cm), weight 143 lb 8 oz (65.1 kg), SpO2 100 %. Body mass index is 23.48 kg/(m^2).  Patient Active Problem List   Diagnosis     Advanced directives, counseling/discussion     Chalazion of left upper eyelid     Chalazion     Vitamin D deficiency disease     Need for prophylactic vaccination and inoculation against influenza-refuses     Hyperlipidemia with target LDL less than 130     Nonsmoker       Wt Readings from Last 2 Encounters:   01/17/18 143 lb 8 oz (65.1 kg)   10/16/17 141 lb 6.4 oz (64.1 kg)     BP Readings from Last 3 Encounters:   01/17/18 128/88   10/16/17 122/83   02/06/17 121/84         Current Outpatient Prescriptions   Medication     Multiple Vitamins-Minerals (MULTI COMPLETE PO)     Probiotic Product (SOLUBLE FIBER/PROBIOTICS PO)     prednisoLONE acetate (PRED FORTE) 1 % ophthalmic susp     No current facility-administered medications for this visit.        Social History   Substance Use Topics     Smoking status: Never Smoker     Smokeless tobacco: Never Used     Alcohol use 0.0 oz/week     0 Standard drinks or equivalent per week      Comment: occasionally       Health Maintenance Due   Topic Date Due     PNEUMOCOCCAL (1 of 2 - PCV13) 09/11/2016     AORTIC ANEURYSM SCREENING (SYSTEM ASSIGNED)  09/11/2016     ADVANCE DIRECTIVE PLANNING Q5 YRS  10/26/2017       No results found for: PAP      January 17, 2018 12:48 PM  "

## 2018-01-17 NOTE — MR AVS SNAPSHOT
After Visit Summary   1/17/2018    Maite Hale    MRN: [id number]           Patient Information     Date Of Birth          1950        Visit Information        Provider Department      1/17/2018 1:00 PM Susan Mcallister MD [facility]        Today's Diagnoses     Elevated TSH    -  1    Decreased hearing, unspecified laterality        Acute seasonal allergic rhinitis due to other allergen        Onychomycosis           Follow-ups after your visit        Additional Services     AUDIOLOGY ADULT REFERRAL       Your provider has referred you to:       [facility]alth: [facility] [place] [phone]   [email or web address]    OR      ENT specialty care [facility]    [address], [place] [address]  Hours: Open   Closes 5PM  Phone: [phone]      Specialty Testing:  Audiogram w/Tymps and Reflexes (Comprehensive Audiology Evaluation)                  Your next 10 appointments already scheduled     Feb 26, 2018  5:20 PM CST   (Arrive by 5:05 PM)   New Patient Visit with Jimmie Petty MD   [facility] ([facility])    [address] [place] [id number] [phone]              Who to contact     Please call your clinic at [phone] to:    Ask questions about your health    Make or cancel appointments    Discuss your medicines    Learn about your test results    Speak to your doctor   If you have compliments or concerns about an experience at your clinic, or if you wish to file a complaint, please contact [facility] Patient Relations at [phone] or email us at [email or web address]         Additional Information About Your Visit        MyChart Information     [employer] gives you secure access to your electronic health record. If you see a primary care provider, you can also send  "messages to your care team and make appointments. If you have questions, please call your primary care clinic.  If you do not have a primary care provider, please call [phone] and they will assist you.      [employer] is an electronic gateway that provides easy, online access to your medical records. With [employer], you can request a clinic appointment, read your test results, renew a prescription or communicate with your care team.     To access your existing account, please contact your [facility] or call [phone] for assistance.        Care EveryWhere ID     This is your Care EveryWhere ID. This could be used by other organizations to access your [facility] medical records  [id number]        Your Vitals Were     Pulse Temperature Height Pulse Oximetry BMI (Body Mass Index)       92 98.2  F (36.8  C) (Oral) 5' 5.55\" (166.5 cm) 100% 23.48 kg/m2        Blood Pressure from Last 3 Encounters:   01/17/18 128/88   10/16/17 122/83   02/06/17 121/84    Weight from Last 3 Encounters:   01/17/18 143 lb 8 oz (65.1 kg)   10/16/17 141 lb 6.4 oz (64.1 kg)   02/06/17 143 lb (64.9 kg)              We Performed the Following     AUDIOLOGY ADULT REFERRAL     TSH with free T4 reflex          Today's Medication Changes          These changes are accurate as of: 1/17/18  1:48 PM.  If you have any questions, ask your nurse or doctor.               Start taking these medicines.        Dose/Directions    Efinaconazole 10 % Soln   Used for:  Onychomycosis   Started by:  Susan Mcallister MD        Externally apply topically daily   Quantity:  8 mL   Refills:  3            Where to get your medicines      These [facility] [address] [facility] [address] [facility]  [address] [facility] [address]    Hours:  24-hours Phone:  [phone]     Efinaconazole 10 % Soln                Primary Care Provider Office Phone # Fax #    " Susan Mcallister [phone]       [address] [place] [address]        Equal Access to Services     CARMENZA LAUREN : Hadii aad ku hadabdelrahmanjune Mario, jacoby love, herminioguido bakerestellatomasa worleymiguel, emmanuel bullard meirpaul saenz florencia whelan. So [facility] [phone].    ATENCIÓN: Si habla español, tiene a noel disposición servicios gratuitos de asistencia lingüística. Llame al [phone].    We comply with applicable federal civil rights laws and [place] laws. We do not discriminate on the basis of race, color, national origin, age, disability, sex, sexual orientation, or gender identity.            Thank you!     Thank you for choosing [facility]  for your care. Our goal is always to provide you with excellent care. Hearing back from our patients is one way we can continue to improve our services. Please take a few minutes to complete the written survey that you may receive in the mail after your visit with us. Thank you!             Your Updated Medication List - Protect others around you: Learn how to safely use, store and throw away your medicines at www.disposemymeds.org.          This list is accurate as of: 1/17/18  1:48 PM.  Always use your most recent med list.                   Brand Name Dispense Instructions for use Diagnosis    Efinaconazole 10 % Soln     8 mL    Externally apply topically daily    Onychomycosis       MULTI COMPLETE PO      Take 1 capsule by mouth daily        prednisoLONE acetate 1 % ophthalmic susp    PRED FORTE    1 Bottle    Place 1 drop into the right eye 4 times daily    PCO (posterior capsule opacification), right       SOLUBLE FIBER/PROBIOTICS PO      Take 1 capful by mouth daily

## 2018-01-17 NOTE — PROGRESS NOTES
Maite Hale is a 67 year old male here to establish care. He just had a checkup in the past 6 months. He wishes to discuss the following issues:    Concern about thyroid disease  Maite has a hx of elevated TSH. He has never been treated for thyroid disease. He reports some mild constipation, dry skin, worse in the winter months. No family history of thyroid disease. He would like his level rechecked. T4 has always been in normal range.   TSH   Date Value Ref Range Status   10/28/2016 4.11 (H) 0.40 - 4.00 mU/L Final     Hx of Angioedema  Maite has hx of spring time allergies. About 25 yr ago he developed eye swelling and lip swelling. No tongue swelling, hives or air way compromise. He took zyrtec and symptoms slowly resolved over a course of weeks. He could not identify a trigger. Then, in 12/2014, he visited [facility] and drank home made wine, high in sulfites.  He had recurrence of eye and lip swelling. He took zyrtec and fexofenadine.  He has since been evaluated by an allergist at [facility]. He had normal complement levels and normal C1 Esterase inhibitor level. He has never needed to use an epi pen. He has a pen at home and he is familiar with use, if needed. He understands he would need to call 911 if he uses the pen.     Hearing concerns  Mild impairment on right side. No tinnitus, no vertigo. He would like referral for a formal hearing test.   He has harder time hearing voices in a crowd. No tinnitus, no balance issues.     PMH, PSH, FH, medications, allergies and immunizations are updated this visit.    Patient Active Problem List   Diagnosis     Advanced directives, counseling/discussion     Need for prophylactic vaccination and inoculation against influenza-refuses     Hyperlipidemia with target LDL less than 130     Nonsmoker     Acute seasonal allergic rhinitis     History of angioedema     Elevated TSH       Current Outpatient Prescriptions   Medication Sig Dispense Refill     Efinaconazole 10 % SOLN  "Externally apply topically daily 8 mL 3     Multiple Vitamins-Minerals (MULTI COMPLETE PO) Take 1 capsule by mouth daily       Probiotic Product (SOLUBLE FIBER/PROBIOTICS PO) Take 1 capful by mouth daily       prednisoLONE acetate (PRED FORTE) 1 % ophthalmic susp Place 1 drop into the right eye 4 times daily (Patient not taking: Reported on 10/16/2017) 1 Bottle 0       Allergies   Allergen Reactions     No Known Allergies         Social  ,   at the [employer]  2 daughters, ages 22, 25    HABITS:  Tob: never  ETOH: 1/ week  Calcium: 3-4 per day  Caffeine: 2-3 per day  Exercise: 2-3 x per week    CONSTITUTIONAL:NEGATIVE for fever, chills, change in weight  INTEGUMENTARY/SKIN: NEGATIVE for worrisome rashes, moles or lesions  EYES: NEGATIVE for vision changes or irritation, positive hx of cataract surgery right eye  ENT/MOUTH: NEGATIVE for ear, mouth and throat problems  RESP:NEGATIVE for significant cough or SOB  CV: NEGATIVE for chest pain, palpitations or peripheral edema  GI: NEGATIVE for nausea, abdominal pain, heartburn. He reports some constipation.   : normal menstrual cycles, no vaginal discharge, no urinary complaints  MUSCULOSKELETAL:NEGATIVE for significant arthralgias or myalgia  NEURO: NEGATIVE for weakness, dizziness or paresthesias, no headaches. He reports some mild memory issues, forgets names easily.   ENDOCRINE: NEGATIVE for temperature intolerance, skin/hair changes, no polydipsia/polyuria, Thyroid issue, as above  HEME: NEGATIVE for bleeding problems or clotting problems  PSYCHIATRIC: NEGATIVE for changes in mood or affect.      EXAM  /88  Pulse 92  Temp 98.2  F (36.8  C) (Oral)  Ht 5' 5.55\" (166.5 cm)  Wt 143 lb 8 oz (65.1 kg)  SpO2 100%  BMI 23.48 kg/m2  Gen: Alert, pleasant, NAD  HEENT:  Conjunctiva nl, TM normal bilaterally, OP clear, no posterior erythema  Neck: no LAD or TM  COR: S1,S2, no murmur  Lungs: CTA bilaterally, no rhonchi, wheezes or " rales  Abdomen: Soft, non tender, normal bowel sounds, no HSM or mass  Ext: no peripheral edema, pulses full  Toenails: mild thickening of great toenails      Assessment:  (R94.6) Elevated TSH  (primary encounter diagnosis)  Comment: reviewed previous medical record, slightly elevated TSH. Some constipation, dry skin  Plan: TSH with free T4 reflex  TSH   Date Value Ref Range Status   01/17/2018 4.50 (H) 0.40 - 4.00 mU/L Final   Level is slightly elevated, but plan is to repeat in one year.              (H91.90) Decreased hearing, unspecified laterality  Comment: he would like formal hearing screen  Plan: AUDIOLOGY ADULT REFERRAL        Referral is given, he will check with insurance    (J30.2) Acute seasonal allergic rhinitis due to other allergen  Comment: he uses OTC antihistamines and flonase, he has idiopathic episodic angioedema, negative workup  Plan: continue use antihistamines prn. Family has Epi pen at home, discussed that if this is used, call 911    (M18.1) Onychomycosis  Comment: he is asking for med refill for nail fungus  Plan: Efinaconazole 10 % SOLN        rx sent.    Susan Mcallister MD  Internal Medicine/Pediatrics

## 2018-02-07 ENCOUNTER — DOCUMENTATION ONLY (OUTPATIENT)
Dept: VASCULAR SURGERY | Facility: CLINIC | Age: 68
End: 2018-02-07

## 2018-02-07 DIAGNOSIS — Z13.6 ENCOUNTER FOR ABDOMINAL AORTIC ANEURYSM (AAA) SCREENING: Primary | ICD-10-CM

## 2018-05-17 ENCOUNTER — OFFICE VISIT (OUTPATIENT)
Dept: FAMILY MEDICINE | Facility: CLINIC | Age: 68
End: 2018-05-17
Payer: COMMERCIAL

## 2018-05-17 VITALS
DIASTOLIC BLOOD PRESSURE: 81 MMHG | WEIGHT: 143.5 LBS | SYSTOLIC BLOOD PRESSURE: 129 MMHG | HEART RATE: 78 BPM | TEMPERATURE: 97.8 F | BODY MASS INDEX: 23.06 KG/M2 | OXYGEN SATURATION: 96 % | HEIGHT: 66 IN

## 2018-05-17 DIAGNOSIS — R07.0 THROAT PAIN: ICD-10-CM

## 2018-05-17 DIAGNOSIS — R05.9 COUGH: Primary | ICD-10-CM

## 2018-05-17 ASSESSMENT — PAIN SCALES - GENERAL: PAINLEVEL: NO PAIN (0)

## 2018-05-17 NOTE — NURSING NOTE
"67 year old  Chief Complaint   Patient presents with     RECHECK     Cough for a month and not feeling well. Wants to make sure that lungs are not infected.        Blood pressure 129/81, pulse 78, temperature 97.8  F (36.6  C), temperature source Temporal, height 5' 5.55\" (166.5 cm), weight 143 lb 8 oz (65.1 kg), SpO2 96 %. Body mass index is 23.48 kg/(m^2).  Patient Active Problem List   Diagnosis     Advanced directives, counseling/discussion     Need for prophylactic vaccination and inoculation against influenza-refuses     Hyperlipidemia with target LDL less than 130     Nonsmoker     Acute seasonal allergic rhinitis     History of angioedema     Elevated TSH       Wt Readings from Last 2 Encounters:   05/17/18 143 lb 8 oz (65.1 kg)   01/17/18 143 lb 8 oz (65.1 kg)     BP Readings from Last 3 Encounters:   05/17/18 129/81   01/17/18 128/88   10/16/17 122/83         Current Outpatient Prescriptions   Medication     Multiple Vitamins-Minerals (MULTI COMPLETE PO)     Probiotic Product (SOLUBLE FIBER/PROBIOTICS PO)     Efinaconazole 10 % SOLN     prednisoLONE acetate (PRED FORTE) 1 % ophthalmic susp     No current facility-administered medications for this visit.        Social History   Substance Use Topics     Smoking status: Never Smoker     Smokeless tobacco: Never Used     Alcohol use 0.0 oz/week     0 Standard drinks or equivalent per week      Comment: occasionally       Health Maintenance Due   Topic Date Due     PNEUMOCOCCAL (1 of 2 - PCV13) 09/11/2016     ADVANCE DIRECTIVE PLANNING Q5 YRS  10/26/2017       No results found for: GRICEL Rodriguez MA  May 17, 2018 3:14 PM    "

## 2018-05-17 NOTE — MR AVS SNAPSHOT
After Visit Summary   5/17/2018    Maite Hale    MRN: [id number]           Patient Information     Date Of Birth          1950        Visit Information        Provider Department      5/17/2018 3:00 PM Susan Mcallister MD [facility]        Today's Diagnoses     Cough    -  1      Care Instructions    Mucinex (guaifenisen) 600mg twice daily to loosen a dry cough  Sleep more upright    Benadryl to dry up the postnasal drip            Follow-ups after your visit        Future tests that were ordered for you today     Open Future Orders        Priority Expected Expires Ordered    XR Chest 2 Views Routine 5/17/2018 5/17/2019 5/17/2018            Who to contact     Please call your clinic at [phone] to:    Ask questions about your health    Make or cancel appointments    Discuss your medicines    Learn about your test results    Speak to your doctor            Additional Information About Your Visit        MyChart Information     [employer] gives you secure access to your electronic health record. If you see a primary care provider, you can also send messages to your care team and make appointments. If you have questions, please call your primary care clinic.  If you do not have a primary care provider, please call [phone] and they will assist you.      [employer] is an electronic gateway that provides easy, online access to your medical records. With [employer], you can request a clinic appointment, read your test results, renew a prescription or communicate with your care team.     To access your existing account, please contact your [facility] or call [phone] for assistance.        Care EveryWhere ID     This is your Care EveryWhere ID. This could be used by other organizations to access your [place] medical records  [id number]        Your Vitals Were     Pulse Temperature Height Pulse Oximetry BMI (Body Mass Index)       78 97.8  F (36.6  C)  "(Temporal) 5' 5.55\" (166.5 cm) 96% 23.48 kg/m2        Blood Pressure from Last 3 Encounters:   05/17/18 129/81   01/17/18 128/88   10/16/17 122/83    Weight from Last 3 Encounters:   05/17/18 143 lb 8 oz (65.1 kg)   01/17/18 143 lb 8 oz (65.1 kg)   10/16/17 141 lb 6.4 oz (64.1 kg)               Primary Care Provider Office Phone # Fax #    Susan Mary Mcallister [phone]       [address] [place] [id number]        Equal Access to Services     CARMENZA LAUREN : Hadii zeb Mario, wabrandy love, rao ramírez, emmanuel don . So [facility] [phone].    ATENCIÓN: Si habla español, tiene a noel disposición servicios gratuitos de asistencia lingüística. Llame al [phone].    We comply with applicable federal civil rights laws and [place] laws. We do not discriminate on the basis of race, color, national origin, age, disability, sex, sexual orientation, or gender identity.            Thank you!     Thank you for choosing [facility]  for your care. Our goal is always to provide you with excellent care. Hearing back from our patients is one way we can continue to improve our services. Please take a few minutes to complete the written survey that you may receive in the mail after your visit with us. Thank you!             Your Updated Medication List - Protect others around you: Learn how to safely use, store and throw away your medicines at www.disposemymeds.org.          This list is accurate as of 5/17/18  3:47 PM.  Always use your most recent med list.                   Brand Name Dispense Instructions for use Diagnosis    Efinaconazole 10 % Soln     8 mL    Externally apply topically daily    Onychomycosis       MULTI COMPLETE PO      Take 1 capsule by mouth daily        prednisoLONE acetate 1 % ophthalmic susp    PRED FORTE    1 Bottle    Place 1 drop into the right eye 4 times daily    PCO (posterior capsule opacification), right "       SOLUBLE FIBER/PROBIOTICS PO      Take 1 capful by mouth daily

## 2018-05-17 NOTE — PATIENT INSTRUCTIONS
Mucinex (guaifenisen) 600mg twice daily to loosen a dry cough  Sleep more upright    Benadryl to dry up the postnasal drip

## 2018-05-17 NOTE — PROGRESS NOTES
"Maite Hale is a 67 year old male here for the following issues:    Cough  Maite is a 66 yo male who is generally healthy. He reports a 24 h history of cough and post nasal drip, along with some chest tightness. Symptoms worsen when he is feeling more tired. He had an itchy and scratchy throat last night, did not sleep well. He has a hx of seasonal allergies, usually worse in the summer. He typically gets itchy watery eyes and runny nose. These symptoms feel different.     He reports he usually exercises several times a week and really pushed himself during his workout, then felt more tired afterward than usual.  No fevers. He reports occasional palpitations, no syncope or chest pain.     Patient Active Problem List   Diagnosis     Advanced directives, counseling/discussion     Need for prophylactic vaccination and inoculation against influenza-refuses     Hyperlipidemia with target LDL less than 130     Nonsmoker     Acute seasonal allergic rhinitis     History of angioedema     Elevated TSH       Current Outpatient Prescriptions   Medication Sig Dispense Refill     Multiple Vitamins-Minerals (MULTI COMPLETE PO) Take 1 capsule by mouth daily       Probiotic Product (SOLUBLE FIBER/PROBIOTICS PO) Take 1 capful by mouth daily       Efinaconazole 10 % SOLN Externally apply topically daily (Patient not taking: Reported on 5/17/2018) 8 mL 3     prednisoLONE acetate (PRED FORTE) 1 % ophthalmic susp Place 1 drop into the right eye 4 times daily (Patient not taking: Reported on 5/17/2018) 1 Bottle 0       Allergies   Allergen Reactions     No Known Allergies         EXAM  /81 (BP Location: Right arm, Patient Position: Chair, Cuff Size: Adult Regular)  Pulse 78  Temp 97.8  F (36.6  C) (Temporal)  Ht 5' 5.55\" (166.5 cm)  Wt 143 lb 8 oz (65.1 kg)  SpO2 96%  BMI 23.48 kg/m2  Gen: appears slightly fatigued  HEENT:  Conjunctiva nl, TM normal bilaterally, OP clear, minimal posterior erythema  Neck: no significant " LAD  COR: S1,S2, no murmur  Lungs: CTA bilaterally, no rhonchi, wheezes or rales  Abdomen: Soft, non tender, normal bowel sounds, no HSM or mass      Assessment:  (R05) Cough  (primary encounter diagnosis)  Comment: suspect this is a viral illness. Pt pressing for CXR  Plan: XR Chest 2 Views        Xray ordered, discussed symptomatic cares, consider albuterol if chest remains tight or he develops wheezing.     (R07.0) Throat pain  Comment: he contacted me via My Chart to report bad sore throat today  Plan: CBC with platelets differential, Rapid strep         screen        Will check rapid strep and CBC tomorrow, when he goes for CXR at the [facility]      Susan Mcallister MD  Internal Medicine/Pediatrics

## 2018-05-18 ENCOUNTER — RADIANT APPOINTMENT (OUTPATIENT)
Dept: GENERAL RADIOLOGY | Facility: CLINIC | Age: 68
End: 2018-05-18
Attending: INTERNAL MEDICINE
Payer: COMMERCIAL

## 2018-05-18 DIAGNOSIS — R05.9 COUGH: ICD-10-CM

## 2018-05-21 DIAGNOSIS — R07.0 THROAT PAIN: ICD-10-CM

## 2018-05-21 LAB
BASOPHILS # BLD AUTO: 0 10E9/L (ref 0–0.2)
BASOPHILS NFR BLD AUTO: 0.4 %
DIFFERENTIAL METHOD BLD: NORMAL
EOSINOPHIL # BLD AUTO: 0.1 10E9/L (ref 0–0.7)
EOSINOPHIL NFR BLD AUTO: 1.4 %
ERYTHROCYTE [DISTWIDTH] IN BLOOD BY AUTOMATED COUNT: 12.8 % (ref 10–15)
HCT VFR BLD AUTO: 43.8 % (ref 40–53)
HGB BLD-MCNC: 14.6 G/DL (ref 13.3–17.7)
IMM GRANULOCYTES # BLD: 0 10E9/L (ref 0–0.4)
IMM GRANULOCYTES NFR BLD: 0.3 %
LYMPHOCYTES # BLD AUTO: 1.2 10E9/L (ref 0.8–5.3)
LYMPHOCYTES NFR BLD AUTO: 15.8 %
MCH RBC QN AUTO: 29.4 PG (ref 26.5–33)
MCHC RBC AUTO-ENTMCNC: 33.3 G/DL (ref 31.5–36.5)
MCV RBC AUTO: 88 FL (ref 78–100)
MONOCYTES # BLD AUTO: 0.5 10E9/L (ref 0–1.3)
MONOCYTES NFR BLD AUTO: 6.4 %
NEUTROPHILS # BLD AUTO: 5.8 10E9/L (ref 1.6–8.3)
NEUTROPHILS NFR BLD AUTO: 75.7 %
NRBC # BLD AUTO: 0 10*3/UL
NRBC BLD AUTO-RTO: 0 /100
PLATELET # BLD AUTO: 217 10E9/L (ref 150–450)
RBC # BLD AUTO: 4.96 10E12/L (ref 4.4–5.9)
S PYO AG THROAT QL IA.RAPID: NEGATIVE
WBC # BLD AUTO: 7.7 10E9/L (ref 4–11)

## 2018-05-22 DIAGNOSIS — R05.9 COUGH: Primary | ICD-10-CM

## 2018-05-22 RX ORDER — PREDNISONE 20 MG/1
TABLET ORAL
Qty: 10 TABLET | Refills: 0 | Status: SHIPPED | OUTPATIENT
Start: 2018-05-22 | End: 2019-02-18

## 2018-05-22 RX ORDER — BENZONATATE 200 MG/1
200 CAPSULE ORAL 3 TIMES DAILY PRN
Qty: 30 CAPSULE | Refills: 0 | Status: SHIPPED | OUTPATIENT
Start: 2018-05-22 | End: 2019-02-18

## 2018-05-22 RX ORDER — ALBUTEROL SULFATE 90 UG/1
2 AEROSOL, METERED RESPIRATORY (INHALATION) EVERY 6 HOURS PRN
Qty: 1 INHALER | Refills: 1 | Status: SHIPPED | OUTPATIENT
Start: 2018-05-22 | End: 2019-02-18

## 2018-05-24 LAB
BACTERIA SPEC CULT: NORMAL
SPECIMEN SOURCE: NORMAL

## 2018-06-07 DIAGNOSIS — R05.9 COUGH: Primary | ICD-10-CM

## 2018-06-28 ENCOUNTER — TELEPHONE (OUTPATIENT)
Dept: FAMILY MEDICINE | Facility: CLINIC | Age: 68
End: 2018-06-28

## 2018-06-28 NOTE — TELEPHONE ENCOUNTER
----- Message from Ari Pinedo sent at 6/28/2018  9:40 AM CDT -----  Regarding: PT is asking to have his x-ray put on a CD and pick it up   Contact: [phone]  PT is asking to have his x-ray put on a CD and pick it up at the clinic.  PT is also leaving town soon and would like it before he leaves.  The PT is requesting a call back and can be reached at [phone].    Thank you,  Ari     Call Center

## 2018-08-11 NOTE — TELEPHONE ENCOUNTER
FUTURE VISIT INFORMATION      FUTURE VISIT INFORMATION:    Date: 8.30.18    Time: 8:00 AM    Location: [facility]  REFERRAL INFORMATION:    Referring provider:  Self-referred    Referring providers clinic:  Self-referred    Reason for visit/diagnosis  Cough, peristent    RECORDS REQUESTED FROM:       Clinic name Comments Records Status Imaging Status   [facility]  EPIC                                    RECORDS STATUS      RECORDS RECEIVED FROM: [facility]   DATE RECEIVED: 8.30.18   NOTES STATUS DETAILS   OFFICE NOTE from referring provider N/A Self-referred   OFFICE NOTE from other specialist Internal 5.17.18 Dr. Hernandez  10.26.16 RUPALI Bernal   DISCHARGE SUMMARY from hospital N/A    DISCHARGE REPORT from the ER N/A    OPERATIVE REPORT N/A    MEDICATION LIST Internal    IMAGING  (NEED IMAGES AND REPORTS)     CT SCAN N/A    CHEST XRAY (CXR) Received 5.18.18  10.26.18    Scheduled for 8.30.18   TESTS     PULMONARY FUNCTION TESTING (PFT) In process Scheduled for 8.30.18   FLOW LOOP VOLUME (FVL) In process Scheduled for 8.30.18   CYSTIC FIBROSIS     CF SPUTUM CULTURE N/A

## 2018-08-30 ENCOUNTER — PRE VISIT (OUTPATIENT)
Dept: PULMONOLOGY | Facility: CLINIC | Age: 68
End: 2018-08-30

## 2018-12-05 ENCOUNTER — OFFICE VISIT (OUTPATIENT)
Dept: FAMILY MEDICINE | Facility: CLINIC | Age: 68
End: 2018-12-05
Payer: COMMERCIAL

## 2018-12-05 VITALS
BODY MASS INDEX: 23.89 KG/M2 | HEART RATE: 81 BPM | RESPIRATION RATE: 16 BRPM | WEIGHT: 146 LBS | TEMPERATURE: 97.8 F | OXYGEN SATURATION: 98 % | DIASTOLIC BLOOD PRESSURE: 83 MMHG | SYSTOLIC BLOOD PRESSURE: 135 MMHG

## 2018-12-05 DIAGNOSIS — Z23 FLU VACCINE NEED: ICD-10-CM

## 2018-12-05 DIAGNOSIS — N52.9 ERECTILE DYSFUNCTION, UNSPECIFIED ERECTILE DYSFUNCTION TYPE: Primary | ICD-10-CM

## 2018-12-05 RX ORDER — SILDENAFIL 50 MG/1
50 TABLET, FILM COATED ORAL DAILY PRN
Qty: 10 TABLET | Refills: 3 | Status: SHIPPED | OUTPATIENT
Start: 2018-12-05 | End: 2019-02-18

## 2018-12-05 ASSESSMENT — PAIN SCALES - GENERAL: PAINLEVEL: NO PAIN (0)

## 2018-12-05 NOTE — NURSING NOTE
"Injectable Influenza Immunization Documentation    1.  Has the patient received the information for the injectable influenza vaccine? YES     2. Is the patient 6 months of age or older? YES     3. Does the patient have any of the following contraindications?         Severe allergy to eggs? No     Severe allergic reaction to previous influenza vaccines? No   Severe allergy to latex? No       History of Guillain-[place] syndrome? No     Currently have a temperature greater than 100.4F? No        4.  Severely egg allergic patients should have flu vaccine eligibility assessed by an MD, RN, or pharmacist, and those who received flu vaccine should be observed for 15 min by an MD, RN, Pharmacist, Medical Technician, or member of clinic staff.\": YES    5. Latex-allergic patients should be given latex-free influenza vaccine Yes. Please reference the Vaccine latex table to determine if your clinic s product is latex-containing.       Vaccination given by PETEY Cheney        "

## 2018-12-05 NOTE — NURSING NOTE
68 year old  Chief Complaint   Patient presents with     Personal Problem     discuss with provider       Blood pressure 135/83, pulse 81, temperature 97.8  F (36.6  C), temperature source Oral, resp. rate 16, weight 146 lb (66.2 kg), SpO2 98 %. Body mass index is 23.89 kg/(m^2).  Patient Active Problem List   Diagnosis     Advanced directives, counseling/discussion     Need for prophylactic vaccination and inoculation against influenza-refuses     Hyperlipidemia with target LDL less than 130     Nonsmoker     Acute seasonal allergic rhinitis     History of angioedema     Elevated TSH       Wt Readings from Last 2 Encounters:   12/05/18 146 lb (66.2 kg)   05/17/18 143 lb 8 oz (65.1 kg)     BP Readings from Last 3 Encounters:   12/05/18 135/83   05/17/18 129/81   01/17/18 128/88         Current Outpatient Prescriptions   Medication     Multiple Vitamins-Minerals (MULTI COMPLETE PO)     Omega-3 Fatty Acids (FISH OIL BURP-LESS PO)     Probiotic Product (SOLUBLE FIBER/PROBIOTICS PO)     VITAMIN D, CHOLECALCIFEROL, PO     albuterol (PROAIR HFA/PROVENTIL HFA/VENTOLIN HFA) 108 (90 Base) MCG/ACT Inhaler     benzonatate (TESSALON) 200 MG capsule     Efinaconazole 10 % SOLN     prednisoLONE acetate (PRED FORTE) 1 % ophthalmic susp     predniSONE (DELTASONE) 20 MG tablet     No current facility-administered medications for this visit.        Social History   Substance Use Topics     Smoking status: Never Smoker     Smokeless tobacco: Never Used     Alcohol use 0.0 oz/week     0 Standard drinks or equivalent per week      Comment: occasionally       Health Maintenance Due   Topic Date Due     PNEUMOCOCCAL (1 of 2 - PCV13) 09/11/2016     ADVANCE DIRECTIVE PLANNING Q5 YRS  10/26/2017     INFLUENZA VACCINE (1) 09/01/2018     FALL RISK ASSESSMENT  10/16/2018       No results found for: PAP      December 5, 2018 10:05 AM

## 2018-12-07 LAB
SHBG SERPL-SCNC: 57 NMOL/L (ref 11–80)
TESTOST FREE SERPL-MCNC: 9.43 NG/DL (ref 4.7–24.4)
TESTOST SERPL-MCNC: 627 NG/DL (ref 240–950)

## 2018-12-29 NOTE — PROGRESS NOTES
CHIEF COMPLAINT:  Sexual dysfunction question.      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      He is 68 years old and still has a fairly strong libido.  He is .  He is more interested in being sexually active than his wife is.  This has been a point of contention.  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  It is hard to do so when he is with his wife.  It sounds like there is a bit of a disconnect between affection and desire.  He wonders a little bit about his testosterone levels and wonders if that could be an issue.  We a long conversation about this and discussed the fact that there can always be a disconnect between 1 partner and the other.  This can be very tricky.  [facility] to [facility] and ideally could do that with his wife.  He suspects that she will not want to do that.  In fact, he hopes that she would be willing to meet with me (though I am not her primary care provider) to have a very similar discussion with her.      ACTIVE MEDICAL PROBLEMS/CURRENT MEDICATIONS:  Briefly reviewed.      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  Affect is upbeat.  Breathing comfortably.  Vital signs are stable.  No physical exam was done today.  We had a good conversation about his concerns.  We will check a free and total testosterone level.      ASSESSMENT AND PLAN:   1.  Sexual performance issues that are largely interpersonal with Professor Hale and his wife.  He does not exactly have erectile dysfunction and does not need to be on a PDE medication.  Nevertheless, we will check testosterone levels just to make sure that they are fine.   2.  He needs a seasonal influenza immunization.  We will go ahead and give that to him today.   3.  Would recommend that he follow up with the [facility] if he and his wife need some  interpersonal counseling regarding sexual intimacy issues.  Call with any problems or questions.

## 2019-02-05 ENCOUNTER — DOCUMENTATION ONLY (OUTPATIENT)
Dept: CARE COORDINATION | Facility: CLINIC | Age: 69
End: 2019-02-05

## 2019-02-18 ENCOUNTER — OFFICE VISIT (OUTPATIENT)
Dept: FAMILY MEDICINE | Facility: CLINIC | Age: 69
End: 2019-02-18
Payer: COMMERCIAL

## 2019-02-18 VITALS
HEART RATE: 84 BPM | WEIGHT: 144.08 LBS | SYSTOLIC BLOOD PRESSURE: 120 MMHG | TEMPERATURE: 97.4 F | BODY MASS INDEX: 23.57 KG/M2 | OXYGEN SATURATION: 100 % | DIASTOLIC BLOOD PRESSURE: 80 MMHG

## 2019-02-18 DIAGNOSIS — R22.0 SWOLLEN NOSTRIL: ICD-10-CM

## 2019-02-18 DIAGNOSIS — M71.30 SYNOVIAL CYST: ICD-10-CM

## 2019-02-18 DIAGNOSIS — H61.23 BILATERAL IMPACTED CERUMEN: Primary | ICD-10-CM

## 2019-02-18 ASSESSMENT — PAIN SCALES - GENERAL: PAINLEVEL: NO PAIN (0)

## 2019-02-18 NOTE — NURSING NOTE
68 year old  Chief Complaint   Patient presents with     Ear Problem     right ear might be losing hearing.      Referral     to ENT.     Nose Problem       Blood pressure 120/80, pulse 84, temperature 97.4  F (36.3  C), temperature source Oral, weight 65.4 kg (144 lb 1.3 oz), SpO2 100 %. Body mass index is 23.57 kg/m .  Patient Active Problem List   Diagnosis     Advanced directives, counseling/discussion     Need for prophylactic vaccination and inoculation against influenza-refuses     Hyperlipidemia with target LDL less than 130     Nonsmoker     Acute seasonal allergic rhinitis     History of angioedema     Elevated TSH       Wt Readings from Last 2 Encounters:   02/18/19 65.4 kg (144 lb 1.3 oz)   12/05/18 66.2 kg (146 lb)     BP Readings from Last 3 Encounters:   02/18/19 120/80   12/05/18 135/83   05/17/18 129/81         Current Outpatient Medications   Medication     Multiple Vitamins-Minerals (MULTI COMPLETE PO)     Omega-3 Fatty Acids (FISH OIL BURP-LESS PO)     Probiotic Product (SOLUBLE FIBER/PROBIOTICS PO)     VITAMIN D, CHOLECALCIFEROL, PO     albuterol (PROAIR HFA/PROVENTIL HFA/VENTOLIN HFA) 108 (90 Base) MCG/ACT Inhaler     benzonatate (TESSALON) 200 MG capsule     Efinaconazole 10 % SOLN     prednisoLONE acetate (PRED FORTE) 1 % ophthalmic susp     predniSONE (DELTASONE) 20 MG tablet     sildenafil (VIAGRA) 50 MG tablet     No current facility-administered medications for this visit.        Social History     Tobacco Use     Smoking status: Never Smoker     Smokeless tobacco: Never Used   Substance Use Topics     Alcohol use: Yes     Alcohol/week: 0.0 oz     Comment: occasionally     Drug use: No       Health Maintenance Due   Topic Date Due     ZOSTER IMMUNIZATION (1 of 2) 09/11/2001     ADVANCE DIRECTIVE PLANNING Q5 YRS  10/26/2017       No results found for: GRICEL Weiner CMA  February 18, 2019 10:45 AM

## 2019-02-18 NOTE — PATIENT INSTRUCTIONS
1. For your nasal swelling, try nightly OTC Flonase (fluticasone) nasal spray, 2 squirts nightly; do that for ~1 week, then let me know how it's working.    2. You likely have a variation of a synovial cyst on your thumb; we can sam (poke) it when I see you for your physical.    3.You have wax in both ears (more on the right than on the left).  Please instill OTC Debrox (or similar) nightly for ~7 days, then call for a appointment for the nurse to wash them out.  (Use 3-4 drops; place cotton ball in ear--not Q-Tip, OR your ear plugs.)

## 2019-02-18 NOTE — PROGRESS NOTES
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    For some time, Maite has noticed decreased hearing, particularly on his right side. He reports that his family has mentioned this to his as well. He has the most trouble with hearing high frequencies. He also had an episode of pain in his right ear, that was exacerbated when he got water in his ear. He is interested in seeing ENT, but needed a referral to do so.    He had surgery as an adolescent to repair a deviated septum on the left side. Recently, he has noticed that when laying on his right side his left nostril becomes very clogged, to the point that it is uncomfortable and difficult to breathe. He is forced to sleep on his right side as a result.    He has an upcoming appointment with dermatology to discuss a mass on his left thumb that he noticed a few months ago. The mass is slightly painful to the touch. It has not increased in size.      FAMILY, SOCIAL AND PAST SURGICAL HISTORIES:  Unchanged.       MEDICATIONS:  Carefully reviewed.       REVIEW OF SYSTEMS:  A 10-point review is negative except as otherwise noted.      OBJECTIVE:    GENERAL: Maite is in no distress.  Affect is upbeat.     VITAL SIGNS: /80 (BP Location: Left arm, Patient Position: Chair, Cuff Size: Adult Regular)   Pulse 84   Temp 97.4  F (36.3  C) (Oral)   Wt 65.4 kg (144 lb 1.3 oz)   SpO2 100%   BMI 23.57 kg/m    HEENT:  Head is normocephalic, atraumatic. Moderate cerumen in left ear, near total cerumen impaction of right ear.  Decreased hearing bilaterally by finger rub.  EXTREMITIES:  Ankles free of any edema.   SKIN:  Warm and dry. Probable variation of synovial cyst on IP joint of left thumb.      ASSESSMENT AND PLAN:   1. Cerumen impaction of right ear: Recommended using Debrox ear drops in each ear nightly for 1 week. Will then return for ear wash.  2. Nasal  congestion: Recommended fluticasone nasal spray nightly for 1 week. Will reach out via [employer] with update on symptoms.  3. Probable variation of synovial cyst: Advised to cancel dermatology appointment. Will watch and wait for any changes, and reevaluate at physical in 1-2 months.    Call with any problems or questions.     I, Erika Bernal, am serving as a scribe to document services personally performed by Dr. Bandar Rangel, based on data collection and the provider's statements to me. Dr. Rangel has reviewed, edited, and approved the above note.     --Bandar Rangel MD

## 2019-02-25 ENCOUNTER — ALLIED HEALTH/NURSE VISIT (OUTPATIENT)
Dept: FAMILY MEDICINE | Facility: CLINIC | Age: 69
End: 2019-02-25
Payer: COMMERCIAL

## 2019-02-25 DIAGNOSIS — H61.23 BILATERAL IMPACTED CERUMEN: Primary | ICD-10-CM

## 2019-02-25 NOTE — PROGRESS NOTES
Maite Hale comes into clinic today at the request of Bandar Rangel Ordering Provider for ear wax removal, bilateral    Pt has known yvette earwax buildup - has been using ear wax softener drops for about 5 days. Ear irrigation completed - large amt cerumen from right ear; smaller amt from left ear. Both ears mostly clear after procedure. Pt tolerated irrigations well - states he does feel slightly dizzy after procedure - he sat in lobby for about 15 minutes after procedure.  Pt will call back with any concerns. He will use ear was softener drops prn if he notes decrease in hearing.     This service provided today was under the supervising provider of the day Bandar Rangel, who was available if needed.    Rosemary Rodríguez

## 2019-04-08 ENCOUNTER — OFFICE VISIT (OUTPATIENT)
Dept: FAMILY MEDICINE | Facility: CLINIC | Age: 69
End: 2019-04-08
Payer: COMMERCIAL

## 2019-04-08 VITALS
HEART RATE: 72 BPM | WEIGHT: 147 LBS | DIASTOLIC BLOOD PRESSURE: 75 MMHG | HEIGHT: 67 IN | SYSTOLIC BLOOD PRESSURE: 123 MMHG | BODY MASS INDEX: 23.07 KG/M2 | OXYGEN SATURATION: 97 % | TEMPERATURE: 97.4 F

## 2019-04-08 DIAGNOSIS — R79.89 ELEVATED TSH: ICD-10-CM

## 2019-04-08 DIAGNOSIS — Z13.1 SCREENING FOR DIABETES MELLITUS: ICD-10-CM

## 2019-04-08 DIAGNOSIS — Z00.00 MEDICARE ANNUAL WELLNESS VISIT, SUBSEQUENT: Primary | ICD-10-CM

## 2019-04-08 DIAGNOSIS — H90.42 SENSORINEURAL HEARING LOSS (SNHL) OF LEFT EAR WITH UNRESTRICTED HEARING OF RIGHT EAR: ICD-10-CM

## 2019-04-08 DIAGNOSIS — B35.1 ONYCHOMYCOSIS: ICD-10-CM

## 2019-04-08 DIAGNOSIS — E78.5 HYPERLIPIDEMIA WITH TARGET LDL LESS THAN 130: ICD-10-CM

## 2019-04-08 DIAGNOSIS — Z12.5 SCREENING FOR PROSTATE CANCER: ICD-10-CM

## 2019-04-08 LAB
CHOLEST SERPL-MCNC: 185 MG/DL (ref 0–200)
CHOLEST/HDLC SERPL: 3.7 {RATIO} (ref 0–5)
FASTING SPECIMEN: YES
GLUCOSE P FAST SERPL-MCNC: 98 MG/DL (ref 51–109)
HDLC SERPL-MCNC: 50 MG/DL
LDLC SERPL CALC-MCNC: 116 MG/DL (ref 0–129)
PSA SERPL-ACNC: 0.98 UG/L (ref 0–4)
TRIGL SERPL-MCNC: 98 MG/DL (ref 0–150)
TSH SERPL DL<=0.005 MIU/L-ACNC: 3.37 MU/L (ref 0.4–4)
VLDL-CHOLESTEROL: 20 (ref 7–32)

## 2019-04-08 ASSESSMENT — MIFFLIN-ST. JEOR: SCORE: 1393.03

## 2019-04-08 NOTE — PROGRESS NOTES
CHIEF COMPLAINT: Medicare annual wellness visit, subsequent      HISTORY OF PRESENT ILLNESS: Maite Hale is a 68 year old male who presents for an annual physical. Overall, he is doing well.      FAMILY, SOCIAL AND PAST SURGICAL HISTORIES:  Unchanged.       MEDICATIONS:  Carefully reviewed.       HEALTHCARE MAINTENANCE:    Health Maintenance   Topic Date Due     ZOSTER IMMUNIZATION (1 of 2) 09/11/2001     ADVANCE DIRECTIVE PLANNING Q5 YRS  10/26/2017     MEDICARE ANNUAL WELLNESS VISIT  10/16/2018     DTAP/TDAP/TD IMMUNIZATION (2 - Td) 05/04/2019     FALL RISK ASSESSMENT  12/05/2019     PHQ-2 Q1 YR  02/18/2020     COLONOSCOPY Q10 YR  07/12/2022     LIPID SCREEN Q5 YR MALE (SYSTEM ASSIGNED)  10/16/2022     INFLUENZA VACCINE  Completed     AORTIC ANEURYSM SCREENING (SYSTEM ASSIGNED)  Completed     HEPATITIS C SCREENING  Completed     IPV IMMUNIZATION  Aged Out     MENINGITIS IMMUNIZATION  Aged Out       Eye exam: Wears glasses, s/p right cataract surgery. Eye care up to date.  Hearing: Noted decreased hearing at appointment on 2/18/19, had a cerumen impaction of the right ear which was treated with an ear wash. Today, able to hear finger rub on right, decreased hearing on left.  Dental: Dental care up to date.  BP: Great.  BMI: Perfect.  Immunizations: Due for Tdap and pneumococcal. Discussed Shingrix vaccine.  Colonoscopy: Last done 7/12/12, repeat in 10 years.  PSA: Due for PSA.    Medicare Preventive Visit:  1. No problems with ADLs  2. No falls  3. No depression  4. Not concerned about memory loss, but his maternal grandmother, mother, and older sister all had Alzheimer's disease later in life. His sister began developing symptoms around age 80.      REVIEW OF SYSTEMS:  A 10-point review is negative.       OBJECTIVE:    GENERAL: Maite Hale is in no distress.  Affect is upbeat.   Alert and oriented x3  VITAL SIGNS: /75 (BP Location: Left arm, Patient Position: Sitting, Cuff Size: Adult Regular)   Pulse  "72   Temp 97.4  F (36.3  C) (Oral)   Ht 1.69 m (5' 6.54\")   Wt 66.7 kg (147 lb)   SpO2 97%   BMI 23.35 kg/m    HEENT:  Head is normocephalic, atraumatic. Ears clear bilaterally. No pain with palpation of the frontal or maxillary sinuses.  Eyes are grossly normal.  Nose is free of any congestion or discharge.  Teeth in good repair.  Mucous membranes are moist.  Throat looks benign.  Neck is supple without adenopathy or thyromegaly.  No carotid bruits are heard, no JVD.   BACK:  Smooth and straight.  No pain to percussion.  No CVA tenderness.   LUNGS:  Clear to auscultation bilaterally.   HEART:  Regular rate and rhythm without murmur.   ABDOMEN:  Benign.     EXTREMITIES:  Ankles free of any edema.   SKIN:  Warm and dry.       LABORATORY DATA: Labs pending.      ASSESSMENT AND PLAN:   1. Medicare annual wellness visit, subsequent  2. Left sided hearing loss: Referred to audiology.  3. Elevated TSH: TSH, pending.  4. Labs: Lipid panel, glucose, and PSA, pending.    Call with any problems or questions.       I, Erika Bernal, am serving as a scribe to document services personally performed by Dr. Bandar Rangel, based on data collection and the provider's statements to me. Dr. Rangel has reviewed, edited, and approved the above note.     --Bandar Rangel MD  "

## 2019-10-03 ENCOUNTER — HEALTH MAINTENANCE LETTER (OUTPATIENT)
Age: 69
End: 2019-10-03

## 2019-10-14 ENCOUNTER — OFFICE VISIT (OUTPATIENT)
Dept: FAMILY MEDICINE | Facility: CLINIC | Age: 69
End: 2019-10-14
Payer: COMMERCIAL

## 2019-10-14 VITALS
WEIGHT: 145.5 LBS | DIASTOLIC BLOOD PRESSURE: 78 MMHG | OXYGEN SATURATION: 99 % | TEMPERATURE: 97.8 F | SYSTOLIC BLOOD PRESSURE: 134 MMHG | HEIGHT: 65 IN | RESPIRATION RATE: 16 BRPM | HEART RATE: 73 BPM | BODY MASS INDEX: 24.24 KG/M2

## 2019-10-14 DIAGNOSIS — H92.01 OTALGIA, RIGHT: Primary | ICD-10-CM

## 2019-10-14 DIAGNOSIS — B35.1 ONYCHOMYCOSIS: ICD-10-CM

## 2019-10-14 ASSESSMENT — MIFFLIN-ST. JEOR: SCORE: 1362.47

## 2019-10-14 NOTE — NURSING NOTE
"69 year old  Chief Complaint   Patient presents with     Otalgia     right ear, intermittent pain for a couple weeks     Mouth/Lip Problem     getting frequent cold sores        Blood pressure 134/78, pulse 73, temperature 97.8  F (36.6  C), temperature source Oral, resp. rate 16, height 1.66 m (5' 5.35\"), weight 66 kg (145 lb 8 oz), SpO2 99 %. Body mass index is 23.95 kg/m .  Patient Active Problem List   Diagnosis     Advanced directives, counseling/discussion     Need for prophylactic vaccination and inoculation against influenza-refuses     Hyperlipidemia with target LDL less than 130     Nonsmoker     Acute seasonal allergic rhinitis     History of angioedema     Elevated TSH       Wt Readings from Last 2 Encounters:   10/14/19 66 kg (145 lb 8 oz)   04/08/19 66.7 kg (147 lb)     BP Readings from Last 3 Encounters:   10/14/19 134/78   04/08/19 123/75   02/18/19 120/80         Current Outpatient Medications   Medication     Efinaconazole (JUBLIA) 10 % SOLN     Multiple Vitamins-Minerals (MULTI COMPLETE PO)     Omega-3 Fatty Acids (FISH OIL BURP-LESS PO)     Probiotic Product (SOLUBLE FIBER/PROBIOTICS PO)     VITAMIN D, CHOLECALCIFEROL, PO     No current facility-administered medications for this visit.        Social History     Tobacco Use     Smoking status: Never Smoker     Smokeless tobacco: Never Used   Substance Use Topics     Alcohol use: Yes     Alcohol/week: 0.0 standard drinks     Comment: occasionally     Drug use: No       Health Maintenance Due   Topic Date Due     ZOSTER IMMUNIZATION (1 of 2) 09/11/2001     PNEUMOCOCCAL IMMUNIZATION 65+ LOW/MEDIUM RISK (1 of 2 - PCV13) 09/11/2016     ADVANCE CARE PLANNING  10/26/2017     DTAP/TDAP/TD IMMUNIZATION (2 - Td) 05/04/2019     INFLUENZA VACCINE (1) 09/01/2019       No results found for: PAP      October 14, 2019 10:11 AM    "

## 2019-10-14 NOTE — PROGRESS NOTES
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 He has been experiencing intermittent right ear pain for the past couple weeks. The pain typically occurs in the morning when he wakes up. He has not noticed a correlation between his pain and decreased hearing.     He continues to have a clogged left nostril. It worsens when he sleeps on his right side. He has tried Flonase nasal spray with no improvement.     He has been getting frequent cold sores. He uses a topical cream that is somewhat effective.     FAMILY, SOCIAL AND PAST SURGICAL HISTORIES:  Unchanged.       MEDICATIONS:  Carefully reviewed.       REVIEW OF SYSTEMS:  A 10-point review is negative except as otherwise noted.      OBJECTIVE:    GENERAL: Maite is in no distress.  Affect is upbeat.     VITAL SIGNS: /78 (BP Location: Right arm, Patient Position: Sitting, Cuff Size: Adult Regular)   Pulse 73   Temp 97.8  F (36.6  C) (Oral)   Resp 16   Ht 1.66 m (5' 5.35\")   Wt 66 kg (145 lb 8 oz)   SpO2 99%   BMI 23.95 kg/m    HEENT:  Head is normocephalic, atraumatic. Ears clear bilaterally. Both ear drums appear healthy.  No redness or fluid behind the ear drum. Throat looks benign. Mild tenderness with palpation at the frontal or maxillary sinuses. Swollen turbinates present.       LABORATORY DATA: none today      ASSESSMENT AND PLAN:   1. Right ear pain. Referral to ENT given. Recommended OTC afrin for now.   2. Encouraged him to keep his upcoming appointment with audiology for a hearing test.   3. Recurrent cold sores, continue topical cream. If becomes more frequent, can prescribe acyclovir.       Call with any problems or questions.     I, Lauro Gaspar, am serving as a scribe to document services personally performed by Dr. Bandar Rangel, based on data collection and the provider's statements to me. Dr. Rangel has reviewed, edited, and approved the above note. "     --Bandar Rangel MD

## 2019-10-16 ENCOUNTER — OFFICE VISIT (OUTPATIENT)
Dept: OPHTHALMOLOGY | Facility: CLINIC | Age: 69
End: 2019-10-16
Attending: OPHTHALMOLOGY
Payer: COMMERCIAL

## 2019-10-16 DIAGNOSIS — H35.30 AGE-RELATED MACULAR DEGENERATION: Primary | ICD-10-CM

## 2019-10-16 DIAGNOSIS — H35.371 EPIRETINAL MEMBRANE (ERM) OF RIGHT EYE: Primary | ICD-10-CM

## 2019-10-16 DIAGNOSIS — H25.13 AGE-RELATED NUCLEAR CATARACT OF BOTH EYES: ICD-10-CM

## 2019-10-16 PROCEDURE — 92134 CPTRZ OPH DX IMG PST SGM RTA: CPT | Mod: ZF | Performed by: OPHTHALMOLOGY

## 2019-10-16 PROCEDURE — G0463 HOSPITAL OUTPT CLINIC VISIT: HCPCS | Mod: ZF

## 2019-10-16 ASSESSMENT — EXTERNAL EXAM - RIGHT EYE: OD_EXAM: NORMAL

## 2019-10-16 ASSESSMENT — REFRACTION_WEARINGRX
OS_SPHERE: -2.25
OS_CYLINDER: SPHERE
OD_CYLINDER: +0.50
SPECS_TYPE: SVL
OD_SPHERE: -1.50
OD_AXIS: 100

## 2019-10-16 ASSESSMENT — TONOMETRY
OD_IOP_MMHG: 17
IOP_METHOD: TONOPEN
OS_IOP_MMHG: 20

## 2019-10-16 ASSESSMENT — VISUAL ACUITY
OS_CC+: -1
OS_CC: 20/20
METHOD_MR: PT DECLINED MR TODAY
CORRECTION_TYPE: GLASSES
METHOD: SNELLEN - LINEAR
OD_CC: 20/20

## 2019-10-16 ASSESSMENT — CONF VISUAL FIELD
METHOD: COUNTING FINGERS
OD_NORMAL: 1
OS_NORMAL: 1

## 2019-10-16 ASSESSMENT — CUP TO DISC RATIO
OS_RATIO: 0.4
OD_RATIO: 0.5

## 2019-10-16 ASSESSMENT — SLIT LAMP EXAM - LIDS
COMMENTS: NORMAL
COMMENTS: NORMAL

## 2019-10-16 ASSESSMENT — EXTERNAL EXAM - LEFT EYE: OS_EXAM: NORMAL

## 2019-10-16 NOTE — NURSING NOTE
Chief Complaints and History of Present Illnesses   Patient presents with     Retinal Evaluation     Chief Complaint(s) and History of Present Illness(es)     Retinal Evaluation               Comments     Pt notes both his brothers have AMD and would like to be looked at to see if he has it as well.  Denies any vision changes in either eye since last visit with Dr. Damian 2017.  Pt complains of halos LE>RE, Cataract Sx RE with Dr. Damian 2015.  Denies any flashes, floaters, pain, pressure, irritation, discharge, and tearing.    Shantel Fermin OT 8:27 AM October 16, 2019

## 2019-10-16 NOTE — PROGRESS NOTES
CC: Family history of AMD     HPI: Maite Hale is a  69 year old year-old patient with history of pseudophakia left eye and mild ERM right eye who presents to retina clinic as a new pt for evaluation of AMD. Pt has never been noted to have AMD, however has 2 brothers (1 ). No metamorphopsia per pt, but notes occasional halo and radiating visual disturbances when he looks at lights. No flashes or floaters.     Past Ocular History:  - Eye or eyelid surgery:    - CE+IOL right eye  (Dr. Damian) - s/p YAG 2017   - Chalazion   - Eye trauma: None  - DM or HTN: None   - FHx glaucoma or AMD: AMD   - Brother 1 - still living, wet AMD, receiving anti-vegf ([place])   - Brother 2 - , dry AMD      Retinal Imaging:  OCT   RE: faint erm; 1 tr RPE elevation subfoveal; grossly normal macula; ?PVD  LE: faint erm; normal macula, no drusen     Assessment & Plan:  1. Family history of AMD   - No drusen on exam or OCT   - Monitor     2. Tr ERM each eye    - Not visually significant     3. Choroidal nevus right eye    - not mentioned before   - <1DD, no drusen   - Consider optos at next visit   - Monitor     4. Pseudophakia right eye    - Dr. Damian    - S/p Yag Cap 2017   - Occasional haloes, but BCVA 20/20 (corrected for near)   - Monitor     Cale Green MD  Ophthalmology Resident  PGY-3     Follow up in one yr with optos pic and Optical Coherence Tomography both eyes           ~~~~~~~~~~~~~~~~~~~~~~~~~~~~~~~~~~   Complete documentation of historical and exam elements from today's encounter can be found in the full encounter summary report (not reduplicated in this progress note).  I personally obtained the chief complaint(s) and history of present illness.  I confirmed and edited as necessary the review of systems, past medical/surgical history, family history, social history, and examination findings as documented by others; and I examined the patient myself.  I personally reviewed the relevant tests, images,  and reports as documented above.  I personally reviewed the ophthalmic test(s) associated with this encounter, agree with the interpretation(s) as documented by the resident/fellow, and have edited the corresponding report(s) as necessary.   I formulated and edited as necessary the assessment and plan and discussed the findings and management plan with the patient and family    Silvia Shaikh MD   of Ophthalmology.  Retina Service   Department of Ophthalmology and Visual Neurosciences   [facility]  Phone: [phone]   Fax: [phone]

## 2019-10-17 ENCOUNTER — TELEPHONE (OUTPATIENT)
Dept: FAMILY MEDICINE | Facility: CLINIC | Age: 69
End: 2019-10-17

## 2019-10-17 NOTE — TELEPHONE ENCOUNTER
Central Prior Authorization Team   Phone: [phone]    PA Initiation    Medication: Efinaconazole (JUBLIA) 10 % SOLN  Insurance Company: [facility] - Phone [phone] Fax [phone]  Pharmacy Filling the Rx: [id number] - [place], [place] - [address] AT [address] [place] [address] & [facility]  Filling Pharmacy Phone: [phone]  Filling Pharmacy Fax:    Start Date: 10/17/2019

## 2019-10-21 NOTE — TELEPHONE ENCOUNTER
PRIOR AUTHORIZATION DENIED    Medication: Efinaconazole (JUBLIA) 10 % SOLN - P/A DENIED    Denial Date: 10/20/2019    Denial Rational:           Appeal Information:

## 2019-10-23 NOTE — TELEPHONE ENCOUNTER
FUTURE VISIT INFORMATION      FUTURE VISIT INFORMATION:    Date: 2/3/2020    Time: 4:15PM    [place] [facility] [place]  REFERRAL INFORMATION:    Referring provider:  Dr Bandar Rangel    Referring providers clinic:  [facility]    Reason for visit/diagnosis  swollen tissue in nostrils     RECORDS REQUESTED FROM:       Clinic name Comments Records Status Imaging Status   [facility] 10/14/19, 2/18/19 notes with Dr Rangel EPIC

## 2019-10-24 NOTE — TELEPHONE ENCOUNTER
Rec'd a form to complete regarding P/A request. Even though I received a denial, I rec'd this afterward, so will complete and fax back.

## 2019-10-28 NOTE — TELEPHONE ENCOUNTER
PRIOR AUTHORIZATION DENIED again    Medication: Efinaconazole (JUBLIA) 10 % SOLN - P/A DENIED    Denial Date: 10/20/2019    Denial Rational:           Appeal Information:

## 2019-11-11 ENCOUNTER — TELEPHONE (OUTPATIENT)
Dept: OPHTHALMOLOGY | Facility: CLINIC | Age: 69
End: 2019-11-11

## 2019-11-11 NOTE — TELEPHONE ENCOUNTER
[facility] Call Center    Phone Message    May a detailed message be left on voicemail: yes    Reason for Call: Other: Patient states he will be out of town for his original appointment on 11/14, but is available 11/12 and 11/13 as well as 11/19. Patient states he believes he needs to be seen sooner per last appointment discussion about cataract - Please advise.      Currently scheduled on 11/27 - would like to be seen at dates listed above.     Action Taken: Message routed to:  [facility] ([facility] Ophthalmology

## 2019-11-12 NOTE — TELEPHONE ENCOUNTER
Spoke to pt at 1005 to review scheduling    Reviewed any new vision changes/concerns    Pt states left eye quick flash with eye movement in past 7-10- days-- no new floaters/  Right eye fuzzier vision in past 1-2 weeks    Scheduled exam tomorrow afternoon with Dr. Shaikh    Pt able to review cataract surgery with Dr Shaikh at exam also.  Morales Jett RN RN 10:18 AM 11/12/19

## 2019-11-13 ENCOUNTER — OFFICE VISIT (OUTPATIENT)
Dept: OPHTHALMOLOGY | Facility: CLINIC | Age: 69
End: 2019-11-13
Attending: OPHTHALMOLOGY
Payer: COMMERCIAL

## 2019-11-13 DIAGNOSIS — H43.813 DEGENERATION OF POSTERIOR VITREOUS BODY OF BOTH EYES: Primary | ICD-10-CM

## 2019-11-13 DIAGNOSIS — H35.371 EPIRETINAL MEMBRANE (ERM) OF RIGHT EYE: ICD-10-CM

## 2019-11-13 DIAGNOSIS — H25.12 NUCLEAR SENILE CATARACT OF LEFT EYE: ICD-10-CM

## 2019-11-13 PROCEDURE — G0463 HOSPITAL OUTPT CLINIC VISIT: HCPCS | Mod: ZF

## 2019-11-13 ASSESSMENT — TONOMETRY
IOP_METHOD: TONOPEN
OD_IOP_MMHG: 20
OS_IOP_MMHG: 20

## 2019-11-13 ASSESSMENT — VISUAL ACUITY
OS_CC: 20/25
OS_CC+: +2
METHOD: SNELLEN - LINEAR
OD_CC: 20/20
CORRECTION_TYPE: GLASSES

## 2019-11-13 ASSESSMENT — SLIT LAMP EXAM - LIDS
COMMENTS: NORMAL
COMMENTS: NORMAL

## 2019-11-13 ASSESSMENT — CONF VISUAL FIELD
OS_NORMAL: 1
OD_NORMAL: 1

## 2019-11-13 ASSESSMENT — EXTERNAL EXAM - LEFT EYE: OS_EXAM: NORMAL

## 2019-11-13 ASSESSMENT — EXTERNAL EXAM - RIGHT EYE: OD_EXAM: NORMAL

## 2019-11-13 ASSESSMENT — CUP TO DISC RATIO
OS_RATIO: 0.4
OD_RATIO: 0.5

## 2019-11-13 NOTE — PROGRESS NOTES
CC: flashing lights left eye     HPI: Maite Hale is a  69 year old year-old patient with history of pseudophakia left eye and mild ERM right eye who presents for flashing left ~10 days ago. Flashing lights have decreased in severity and frequency since then. Denies new floaters. Also notes a faint blurred spot at the center vision both eyes. Blurred spot is stable and not moving. Not sure how long he has had that.     He was seen here ~1 month ago for evaluation of possible AMD. Has 2 brothers (1 ). No metamorphopsia per pt, but noted occasional halo and radiating visual disturbances when he looks at lights.      Past Ocular History:  - Eye or eyelid surgery:    - CE+IOL right eye  (Dr. Damian) - s/p YAG    - Chalazion   - Eye trauma: None  - DM or HTN: None   - FHx glaucoma or AMD: AMD   - Brother 1 - still living, wet AMD, receiving anti-vegf ([place])   - Brother 2 - , dry AMD      Retinal Imaging:  OCT  10/16-  RE: faint erm; 1 tr RPE elevation subfoveal; grossly normal macula; ?PVD  LE: faint erm; normal macula, no drusen     Assessment & Plan:  1. PVD left eye   - acute symptomatic x 10 days; flashing lights that decreased since 10 days ago   - no retinal traction or tear on depressed exam   - Repeat DFE 6-8 weeks     2. Family history of AMD   - No drusen on exam or OCT   - Monitor     3. Tr ERM each eye    - Not visually significant     4. Choroidal nevus right eye    - <1DD, no drusen   - seeing on the Optical Coherence Tomography    - Consider optos at next visit   - Monitor     5. Pseudophakia right eye    - Dr. Damian    - S/p Yag Cap    - Occasional haloes, but BCVA 20/20 (corrected for near)   - Monitor     6. Nuclear senile cataract left eye   Observe for now; vision 20/20    Joshua Huitron MD  Vitreoretinal surgery fellow  [facility]      Follow up in 6 weeks for repeat DFE with optos pic and Optical Coherence Tomography both eyes        ~~~~~~~~~~~~~~~~~~~~~~~~~~~~~~~~~~   Complete documentation of historical and exam elements from today's encounter can be found in the full encounter summary report (not reduplicated in this progress note).  I personally obtained the chief complaint(s) and history of present illness.  I confirmed and edited as necessary the review of systems, past medical/surgical history, family history, social history, and examination findings as documented by others; and I examined the patient myself.  I personally reviewed the relevant tests, images, and reports as documented above.  I personally reviewed the ophthalmic test(s) associated with this encounter, agree with the interpretation(s) as documented by the resident/fellow, and have edited the corresponding report(s) as necessary.   I formulated and edited as necessary the assessment and plan and discussed the findings and management plan with the patient and family    Silvia Shaikh MD   of Ophthalmology.  Retina Service   Department of Ophthalmology and Visual Neurosciences   [facility]  Phone: [phone]   Fax: [phone]

## 2019-11-13 NOTE — NURSING NOTE
Chief Complaints and History of Present Illnesses   Patient presents with     Flashes Left Eye     Chief Complaint(s) and History of Present Illness(es)     Flashes Left Eye     Laterality: left eye    Quality: flashing    Duration: 10 days    Characteristics: intermittent    Course: stable    Associated symptoms: blurred vision.  Negative for photophobia, headaches, dizziness, imbalance, floaters, vomiting, nausea and eye pain    Pain scale: 0/10              Comments     Flashing in LE at night in dark environment. Has noted no more than 7 to 10 days.  Sees a 'haziness which seems opaque' in central VF of RE. Very bothersome today. Has been building up from one to two weeks. Unsure if it also involves LE.  Zara Farr, COT COT 2:39 PM 11/13/2019

## 2019-11-18 ENCOUNTER — OFFICE VISIT (OUTPATIENT)
Dept: AUDIOLOGY | Facility: CLINIC | Age: 69
End: 2019-11-18
Payer: COMMERCIAL

## 2019-11-18 DIAGNOSIS — H90.42 SENSORINEURAL HEARING LOSS (SNHL) OF LEFT EAR WITH UNRESTRICTED HEARING OF RIGHT EAR: ICD-10-CM

## 2019-11-18 DIAGNOSIS — H90.A32 MIXED CONDUCTIVE AND SENSORINEURAL HEARING LOSS OF LEFT EAR WITH RESTRICTED HEARING OF RIGHT EAR: Primary | ICD-10-CM

## 2019-11-18 DIAGNOSIS — H83.3X1 NOISE-INDUCED HEARING LOSS OF RIGHT EAR WITH RESTRICTED HEARING OF LEFT EAR: ICD-10-CM

## 2019-11-18 NOTE — PROGRESS NOTES
AUDIOLOGY REPORT    SUBJECTIVE:  Maite Hale is a 69 year old male who was seen in Audiology at the [facility] for audiologic evaluation.  The patient reports a suspected decline in hearing bilaterally as he has trouble hearing in background noise and his wife notes that he often asks for repetition of speech. The patient denies asymmetry in hearing between ears but reports that his primary care physician does feel he may have high-frequency hearing loss in his left ear after a test was completed at the physician's office. The patient reports previous concern for right ear and neck pain and did discuss this symptom with his primary care physician. The patient reports that the symptom has sinced ceased and he attributes this symptom to muscle issues. The patient reports a history of concussions but none in the past many decades, and he denies any changes in hearing, ear related issues or dizziness after the concussions. The patient reports that his older brother developed unilateral hearing loss. The patient denies a history of noise exposure, other ear related issues or vertigo.    OBJECTIVE:  Fall Risk Screen:  1. Have you fallen two or more times in the past year? No  2. Have you fallen and had an injury in the past year? No    Abuse Screening:  Do you feel unsafe at home or work/school? No  Do you feel threatened by someone? No  Does anyone try to keep you from having contact with others, or doing things outside of your home? No  Physical signs of abuse present? No    Otoscopic exam indicates ears are clear of cerumen bilaterally     Pure Tone Thresholds assessed using conventional audiometry with fair  reliability from 250-8000 Hz bilaterally using circumaural headphones and reassessed using insert earphones    RIGHT:  Normal/borderline normal through 3000 Hz sloping to mild sensorineural hearing loss 2892-2462 Hz    LEFT:    Borderline normal rising to normal  sloping to moderate mixed hearing loss (conductive component 250-500 Hz).     Tympanogram:    RIGHT: normal eardrum mobility    LEFT:   normal eardrum mobility    Reflexes (reported by stimulus ear):  RIGHT: Ipsilateral is present at elevated levels  RIGHT: Contralateral is absent at frequencies tested  LEFT:   Ipsilateral is present at elevated levels  LEFT:   Contralateral is absent at frequencies tested    Speech Reception Threshold:    RIGHT: 5 dB HL    LEFT:   15 dB HL  Word Recognition Score:     RIGHT: 100% at 45 dB HL using NU-6 recorded word list.    LEFT:   100% at 55 dB HL using NU-6 recorded word list.    ASSESSMENT:  Mild high-frequency sensorineural hearing loss for the right ear, normal to moderate mixed hearing loss for the left ear. Today s results were discussed with the patient in detail.     PLAN:    It was recommended that the patient that he consult with ear, nose and throat due to an asymmetry in hearing between ears and the presence of a conductive component 250-500 Hz in the left ear, but the patient declines stating that many people that he knows develop hearing loss in only one ear. The patient was counseled that there may be a middle ear pathology warranting medical intervention and that an asymmetry in hearing could be a sign of retrocochlear issue, but the patient declines scheduling to consult with ENT at this time.  Please call this clinic with questions regarding these results or recommendations.      Siria Camargo.  Licensed Audiologist  [place] #[id number]

## 2019-11-20 ENCOUNTER — MYC MEDICAL ADVICE (OUTPATIENT)
Dept: FAMILY MEDICINE | Facility: CLINIC | Age: 69
End: 2019-11-20

## 2019-11-20 DIAGNOSIS — H90.3 ASYMMETRICAL SENSORINEURAL HEARING LOSS: Primary | ICD-10-CM

## 2019-11-21 ENCOUNTER — TELEPHONE (OUTPATIENT)
Dept: FAMILY MEDICINE | Facility: CLINIC | Age: 69
End: 2019-11-21

## 2019-11-21 NOTE — TELEPHONE ENCOUNTER
Spoke to patient and informed him a brain MRI was ordered. Reviewed information about acoustic neuroma, the reason for the MRI is to evaluate for this. Provided reassurance. Patient would like to meet with Dr. Rangel prior to scheduling an MRI - an appointment was made. All questions answered and patient is comfortable with this plan/.     Jyotsna Troncoso RN  11/21/19  12:11 PM

## 2019-11-21 NOTE — TELEPHONE ENCOUNTER
[facility] Call Center    Phone Message    May a detailed message be left on voicemail: yes    Reason for Call: Other: PT would like a call back regarding his audiology test results.  PT also sent a my chart message yesterday and is wondering why he has not received a response.  Please follow up with the PT.      Action Taken: Message routed to:  [facility]: MARCOS

## 2019-11-21 NOTE — TELEPHONE ENCOUNTER
"In any case, he needs an MRI of his brain.  Dx: Eval for acoustic neuroma.  You can share with him that the worst possible outcome is a tumor (a growth) that is NOT cancerous, though it could require surgery.  That being said, it's likely a benign thing, \"just\" some asymmetric hearing loss.     Bandar       "

## 2019-11-26 ENCOUNTER — OFFICE VISIT (OUTPATIENT)
Dept: FAMILY MEDICINE | Facility: CLINIC | Age: 69
End: 2019-11-26
Payer: COMMERCIAL

## 2019-11-26 VITALS
BODY MASS INDEX: 24.03 KG/M2 | OXYGEN SATURATION: 97 % | WEIGHT: 144.25 LBS | HEIGHT: 65 IN | DIASTOLIC BLOOD PRESSURE: 81 MMHG | SYSTOLIC BLOOD PRESSURE: 129 MMHG | RESPIRATION RATE: 16 BRPM | TEMPERATURE: 99.1 F | HEART RATE: 108 BPM

## 2019-11-26 DIAGNOSIS — H90.3 ASYMMETRICAL SENSORINEURAL HEARING LOSS: Primary | ICD-10-CM

## 2019-11-26 DIAGNOSIS — B35.1 ONYCHOMYCOSIS: ICD-10-CM

## 2019-11-26 SDOH — HEALTH STABILITY: MENTAL HEALTH: HOW OFTEN DO YOU HAVE A DRINK CONTAINING ALCOHOL?: 2-3 TIMES A WEEK

## 2019-11-26 ASSESSMENT — MIFFLIN-ST. JEOR: SCORE: 1355.57

## 2019-11-26 NOTE — PROGRESS NOTES
"CHIEF COMPLAINT: Discuss audiology results      HISTORY OF PRESENT ILLNESS: Maite Hale is a 69 year old male who presents for above.    Maite has asymmetric hearing loss and was seen by audiology on 11/18/19 for evaluation. He has mild high-frequency sensorineural hearing loss in his right ear and normal to moderate mixed hearing loss for his left ear. He was advised to have an MRI and a consult with ENT for further evaluation for possible acoustic neuroma. He wanted to discuss this with me today to know if this is necessary.     He has onychomycosis of his right toenails. He has been using Jublia 10% solution, however now his insurance does not cover it. He has tried several other medications, though he can't recall their names. (He'll send those in to us.)     FAMILY, SOCIAL AND PAST SURGICAL HISTORIES:  Unchanged.       MEDICATIONS:  Carefully reviewed.       REVIEW OF SYSTEMS:  A 10-point review is negative except as otherwise noted.      OBJECTIVE:    GENERAL: He is in no distress.  Affect is upbeat.     VITAL SIGNS: /81 (BP Location: Left arm, Patient Position: Sitting, Cuff Size: Adult Regular)   Pulse 108   Temp 99.1  F (37.3  C) (Oral)   Resp 16   Ht 1.658 m (5' 5.28\")   Wt 65.4 kg (144 lb 4 oz)   SpO2 97%   BMI 23.80 kg/m    EARS: Ears are completely free of cerumen.       LABORATORY DATA: none today      ASSESSMENT AND PLAN:   1. Asymmetrical sensorineural hearing loss (left). MRI ordered to eval for acoustic neuroma. ENT referral given as well.   2. Onychomycosis. Will do P.A. for Jublia, though we'll need to know exactly what he's tried before.  3. Follow-up as needed      Call with any problems or questions.     I, Lauro Gaspar, am serving as a scribe to document services personally performed by Dr. Bandar Rangel, based on data collection and the provider's statements to me. Dr. Rangel has reviewed, edited, and approved the above note.     --Bandar Rangel MD      "

## 2019-11-26 NOTE — NURSING NOTE
"69 year old  Chief Complaint   Patient presents with     Results     talk about results from audiology as well as MRI       Blood pressure 129/81, pulse 108, temperature 99.1  F (37.3  C), temperature source Oral, resp. rate 16, height 1.658 m (5' 5.28\"), weight 65.4 kg (144 lb 4 oz), SpO2 97 %. Body mass index is 23.8 kg/m .  Patient Active Problem List   Diagnosis     Advanced directives, counseling/discussion     Need for prophylactic vaccination and inoculation against influenza-refuses     Hyperlipidemia with target LDL less than 130     Nonsmoker     Acute seasonal allergic rhinitis     History of angioedema     Elevated TSH       Wt Readings from Last 2 Encounters:   11/26/19 65.4 kg (144 lb 4 oz)   10/14/19 66 kg (145 lb 8 oz)     BP Readings from Last 3 Encounters:   11/26/19 129/81   10/14/19 134/78   04/08/19 123/75         Current Outpatient Medications   Medication     Multiple Vitamins-Minerals (MULTI COMPLETE PO)     Omega-3 Fatty Acids (FISH OIL BURP-LESS PO)     VITAMIN D, CHOLECALCIFEROL, PO     Efinaconazole (JUBLIA) 10 % SOLN     Probiotic Product (SOLUBLE FIBER/PROBIOTICS PO)     No current facility-administered medications for this visit.        Social History     Tobacco Use     Smoking status: Never Smoker     Smokeless tobacco: Never Used   Substance Use Topics     Alcohol use: Yes     Alcohol/week: 0.0 standard drinks     Frequency: 2-3 times a week     Comment: occasionally     Drug use: No       Health Maintenance Due   Topic Date Due     ZOSTER IMMUNIZATION (1 of 2) 09/11/2001     PNEUMOCOCCAL IMMUNIZATION 65+ LOW/MEDIUM RISK (1 of 2 - PCV13) 09/11/2016     ADVANCE CARE PLANNING  10/26/2017     DTAP/TDAP/TD IMMUNIZATION (2 - Td) 05/04/2019     INFLUENZA VACCINE (1) 09/01/2019       No results found for: PAP      November 26, 2019 3:30 PM    "

## 2019-12-02 ENCOUNTER — ANCILLARY PROCEDURE (OUTPATIENT)
Dept: MRI IMAGING | Facility: CLINIC | Age: 69
End: 2019-12-02
Attending: FAMILY MEDICINE
Payer: COMMERCIAL

## 2019-12-02 DIAGNOSIS — H90.3 ASYMMETRICAL SENSORINEURAL HEARING LOSS: ICD-10-CM

## 2019-12-02 RX ORDER — GADOBUTROL 604.72 MG/ML
7.5 INJECTION INTRAVENOUS ONCE
Status: COMPLETED | OUTPATIENT
Start: 2019-12-02 | End: 2019-12-02

## 2019-12-02 RX ADMIN — GADOBUTROL 6.5 ML: 604.72 INJECTION INTRAVENOUS at 14:10

## 2019-12-04 ENCOUNTER — MYC MEDICAL ADVICE (OUTPATIENT)
Dept: FAMILY MEDICINE | Facility: CLINIC | Age: 69
End: 2019-12-04

## 2019-12-05 NOTE — TELEPHONE ENCOUNTER
CENTRAL PRIOR AUTHORIZATION  [phone]    PA Initiation    Medication:   Insurance Company: [employer] [place] - Phone [phone] Fax [phone]  Pharmacy Filling the Rx: [id number] - [place], [place] - [address] AT [address] [place] [address] & [facility]  Filling Pharmacy Phone: [phone]  Filling Pharmacy Fax:    Start Date: 12/5/2019    Called and spoke with a PA Rep Franz and recommended that enough time has passed that we can do another submission based the additional information provided and see if that would help. He will send the form to the Central PA Team [phone] and he recommended sending chart notes and anything in regards to why the preferred alternatives are not recommended for the patient.

## 2019-12-09 NOTE — TELEPHONE ENCOUNTER
PRIOR AUTHORIZATION DENIED    Medication: Efinaconazole (JUBLIA) 10 % SOLN - DENIED     Denial Date: 2019    Denial Rational: The request was denied for the following reason: The patient must have one of the followin) Diabetes, 2) Peripheral Vascular Insufficiency, 3) A weakened immune system due to medical condition or treatment, 4) Pain that limits normal activity, or 5) Other infections or skin condition that weaken the skin near the fungal infection. Your fungal nail infection must be confirmed by a lab test, such as Potassium Hydroxide (KOH) preparation, fungal culture, or nail biopsy.        Appeal Information: IF THE PROVIDER WOULD LIKE APPEAL THIS DENIAL, PLEASE HAVE THEM PROVIDE A LETTER OF MEDICAL NECESSITY ALONG WITH ANY DOCUMENTATION THAT STATES THERAPIES TRIED/OUTCOMES. ONCE IT HAS BEEN PLACED IN THE PATIENT'S CHART, PLEASE NOTIFY THE PA TEAM ONCE IT HAS BEEN COMPLETED AND WE CAN INITIATE THE APPEAL ON BEHALF OF THE PROVIDER AND PATIENT.

## 2019-12-11 ENCOUNTER — OFFICE VISIT (OUTPATIENT)
Dept: OPHTHALMOLOGY | Facility: CLINIC | Age: 69
End: 2019-12-11
Attending: OPHTHALMOLOGY
Payer: COMMERCIAL

## 2019-12-11 DIAGNOSIS — H35.371 EPIRETINAL MEMBRANE (ERM) OF RIGHT EYE: ICD-10-CM

## 2019-12-11 DIAGNOSIS — H35.30 AGE-RELATED MACULAR DEGENERATION: ICD-10-CM

## 2019-12-11 DIAGNOSIS — H35.371 EPIRETINAL MEMBRANE (ERM) OF RIGHT EYE: Primary | ICD-10-CM

## 2019-12-11 PROCEDURE — 92134 CPTRZ OPH DX IMG PST SGM RTA: CPT | Mod: ZF | Performed by: OPHTHALMOLOGY

## 2019-12-11 PROCEDURE — G0463 HOSPITAL OUTPT CLINIC VISIT: HCPCS | Mod: ZF

## 2019-12-11 PROCEDURE — 92250 FUNDUS PHOTOGRAPHY W/I&R: CPT | Mod: ZF | Performed by: OPHTHALMOLOGY

## 2019-12-11 ASSESSMENT — CUP TO DISC RATIO
OS_RATIO: 0.4
OD_RATIO: 0.5

## 2019-12-11 ASSESSMENT — VISUAL ACUITY
OD_CC+: -1
OS_PH_CC: 20/30
METHOD: SNELLEN - LINEAR
OS_CC: 20/40
OS_CC+: +2
OD_CC: 20/15

## 2019-12-11 ASSESSMENT — CONF VISUAL FIELD
OD_NORMAL: 1
OS_NORMAL: 1

## 2019-12-11 ASSESSMENT — REFRACTION_WEARINGRX
SPECS_TYPE: SVL
OD_AXIS: 100
OD_SPHERE: -1.50
OD_CYLINDER: +0.50
OS_CYLINDER: SPHERE
OS_SPHERE: -2.25

## 2019-12-11 ASSESSMENT — TONOMETRY
OD_IOP_MMHG: 22
OS_IOP_MMHG: 22
IOP_METHOD: TONOPEN

## 2019-12-11 ASSESSMENT — SLIT LAMP EXAM - LIDS
COMMENTS: NORMAL
COMMENTS: NORMAL

## 2019-12-11 ASSESSMENT — EXTERNAL EXAM - RIGHT EYE: OD_EXAM: NORMAL

## 2019-12-11 ASSESSMENT — EXTERNAL EXAM - LEFT EYE: OS_EXAM: NORMAL

## 2019-12-11 NOTE — PROGRESS NOTES
CC: flashing lights left eye     HPI: Maite Hale is a  69 year old year-old patient with history of pseudophakia left eye and mild ERM right eye who presents for flashing left ~10 days ago. Flashing lights have decreased in severity and frequency since then. Denies new floaters. Also notes a faint blurred spot at the center vision both eyes. Blurred spot is stable and not moving. Not sure how long he has had that.     He was seen here ~1 month ago for evaluation of possible AMD. Has 2 brothers (1 ). No metamorphopsia per pt, but noted occasional halo and radiating visual disturbances when he looks at lights.      Past Ocular History:  - Eye or eyelid surgery:    - CE+IOL right eye  (Dr. Damian) - s/p YAG    - Chalazion   - Eye trauma: None  - DM or HTN: None   - FHx glaucoma or AMD: AMD   - Brother 1 - still living, wet AMD, receiving anti-vegf ([place])   - Brother 2 - , dry AMD      Retinal Imaging:  OCT  19  RE: faint erm; 1 tr RPE elevation subfoveal; grossly normal macula; ?PVD  LE: faint erm; normal macula, no drusen     PHOTOS 19 consistent with exam     Assessment & Plan:  1. PVD left eye   - acute symptomatic x 10 days; flashing lights that decreased since 10 days ago   - no retinal traction or tear on depressed exam   - Repeat DFE 6-8 weeks     2. Family history of AMD   - No drusen on exam or OCT   - Monitor     3. Tr ERM each eye    - Not visually significant     4. Choroidal nevus right eye    - <1DD, no drusen   - seeing on the Optical Coherence Tomography    - Consider optos at next visit   - Monitor     5. Pseudophakia right eye    - Dr. Damian    - S/p Yag Cap    - Occasional haloes, but BCVA 20/20 (corrected for near)   - Monitor     6. Nuclear senile cataract left eye   Observe for now; vision 20/20    Joshua Huitron MD  Vitreoretinal surgery fellow  [facility]      Follow up in 6 months for repeat DFE with optos pic and Optical  Coherence Tomography both eyes   Retina detachment precautions were discussed with the patient (presence or increased in flashes, floaters or a curtain in the visual field) and was asked to return if any of the those occur        ~~~~~~~~~~~~~~~~~~~~~~~~~~~~~~~~~~   Complete documentation of historical and exam elements from today's encounter can be found in the full encounter summary report (not reduplicated in this progress note).  I personally obtained the chief complaint(s) and history of present illness.  I confirmed and edited as necessary the review of systems, past medical/surgical history, family history, social history, and examination findings as documented by others; and I examined the patient myself.  I personally reviewed the relevant tests, images, and reports as documented above.  I personally reviewed the ophthalmic test(s) associated with this encounter, agree with the interpretation(s) as documented by the resident/fellow, and have edited the corresponding report(s) as necessary.   I formulated and edited as necessary the assessment and plan and discussed the findings and management plan with the patient and family    Silvia Shaikh MD   of Ophthalmology.  Retina Service   Department of Ophthalmology and Visual Neurosciences   [facility]  Phone: [phone]   Fax: [phone]

## 2019-12-23 ENCOUNTER — NURSE TRIAGE (OUTPATIENT)
Dept: FAMILY MEDICINE | Facility: CLINIC | Age: 69
End: 2019-12-23

## 2019-12-23 ENCOUNTER — TELEPHONE (OUTPATIENT)
Dept: FAMILY MEDICINE | Facility: CLINIC | Age: 69
End: 2019-12-23

## 2019-12-23 NOTE — TELEPHONE ENCOUNTER
Patient is currently in [place] for the holidays, will be there for another week and then to [facility]. He notes that he had new onset vertigo after running outside in humid warm weather, then going to the beach and being tossed around in the waves. He is unsure if he got water/sand in ears. His daughters did epley maneuver with good relief. He denies severe headache, weakness/numbness on one side of body/face. He is asking for advice if the dizziness returns.    Reason for Disposition    [1] NO dizziness now AND [2] one or more stroke risk factors (i.e., hypertension, diabetes, prior stroke/TIA/heart attack)    Protocols used: DIZZINESS - VERTIGO-A-AH    Reviewed stat symptoms that require immediate attention. Recommended meclozine or Dramamine non-drowsy for OTC medications. He will check local pharmacy for these medications.     Jyotsna Troncoso RN  12/23/19  1:48 PM

## 2019-12-23 NOTE — TELEPHONE ENCOUNTER
[facility] Call [facility]    Phone Message    May a detailed message be left on voicemail: yes    Reason for Call: Other: Patient is on vacation and he has some vertiigo issues, he said he need some advice from Dr. Rangel or his nurse on what he can do. Please call to inform the patient    Action Taken: Other: [facility]

## 2020-01-13 NOTE — TELEPHONE ENCOUNTER
FUTURE VISIT INFORMATION      FUTURE VISIT INFORMATION:    Date: 1/15/2020    Time: 12:30PM    Location: [facility]  REFERRAL INFORMATION:    Referring provider:  Bandar Rangel MD    Referring providers clinic:  [facility]    Reason for visit/diagnosis  Asymmetrical sensorineural hearing loss     RECORDS REQUESTED FROM:       Clinic name Comments Records Status Imaging Status   [facility] 11/26/19 notes with Dr Rangel [facility]  11/18/19 Audiogram  [facility]    [facility] 12/2/19 MR Brain  [facility] PACS

## 2020-01-15 ENCOUNTER — PRE VISIT (OUTPATIENT)
Dept: OTOLARYNGOLOGY | Facility: CLINIC | Age: 70
End: 2020-01-15

## 2020-01-15 ENCOUNTER — OFFICE VISIT (OUTPATIENT)
Dept: OTOLARYNGOLOGY | Facility: CLINIC | Age: 70
End: 2020-01-15
Payer: COMMERCIAL

## 2020-01-15 VITALS
DIASTOLIC BLOOD PRESSURE: 91 MMHG | WEIGHT: 140 LBS | BODY MASS INDEX: 23.1 KG/M2 | SYSTOLIC BLOOD PRESSURE: 158 MMHG | HEART RATE: 112 BPM

## 2020-01-15 DIAGNOSIS — H90.A32 MIXED CONDUCTIVE AND SENSORINEURAL HEARING LOSS OF LEFT EAR WITH RESTRICTED HEARING OF RIGHT EAR: Primary | ICD-10-CM

## 2020-01-15 ASSESSMENT — PAIN SCALES - GENERAL: PAINLEVEL: NO PAIN (0)

## 2020-01-15 NOTE — PATIENT INSTRUCTIONS
1. You were seen in the ENT Clinic today by .  If you have any questions or concerns after your appointment, please call   - Option 1: ENT Clinic: [phone]  - Option 2: Beat[facility] ('s Nurse): [phone]    2.   Plan to return to clinic as needed.     KELLY Henderson  [facility] Otolaryngology  [phone]

## 2020-01-15 NOTE — LETTER
1/15/2020       RE: Maite Hale  [address] [place] [address]     Dear Colleague,    Thank you for referring your patient, Maite aHle, to the [facility] EAR NOSE AND THROAT at [facility]. Please see a copy of my visit note below.    Maite Hale is seen in consultation from Dr. Rangel.  He is a 69 year old male being seen for left sided hearing loss.  He actually isn't bothered by his hearing loss other than when he's in a noisy environment but his daughters had been making fun of him a little bit so he decided to come in to get it checked.  He reports he usually has no problems hearing but if he is out at a noisy restaurant or party, it can be difficulty.  No history of noise exposure.  No history of recurrent ear infections.  No current otalgia or otorrhea.  No vertigo or imbalance.    Past Medical History:   Diagnosis Date     Ascending aorta dilatation (H)     3.8     Hayfever      Lip edema     ? minoxodil       Past Surgical History:   Procedure Laterality Date     CATARACT IOL, RT/LT Right 10/12/15     COLONOSCOPY  2012    repeat in      HAND SURGERY Right     fracture     HERNIA REPAIR Bilateral     mesh placed       Family History   Problem Relation Age of Onset     Neurologic Disorder Mother         Alzheimer, age 84     Neurologic Disorder Maternal Grandmother         Alzheimer     Diabetes Father         63      Hypertension Father         hx of elevated uric acid     Cerebrovascular Disease Father 63         of stroke     Macular Degeneration Brother      Macular Degeneration Brother      Eye Disorder Brother         Macular Degeneration     Neurologic Disorder Sister         Alzheimer, age 74     Cardiovascular Brother         pacemaker     Hypothyroidism Sister      Cancer - colorectal No family hx of      Prostate Cancer No family hx of      Glaucoma No family hx of        Social History     Tobacco Use     Smoking status:  Never Smoker     Smokeless tobacco: Never Used   Substance Use Topics     Alcohol use: Yes     Alcohol/week: 0.0 standard drinks     Frequency: 2-3 times a week     Comment: occasionally     Drug use: No       Patient Supplied Answers to Review of Systems  The remainder of the 10 point review of systems is otherwise negative.    Physical examination:  Constitutional:  In no acute distress, appears stated age  Eyes:  Extraocular movements intact, no spontaneous nystagmus  Ears:  Both ears examined under the microscope.  Ear canals clear, TMs intact with well aerated middle ears.  Respiratory:  No increased work of breathing, wheezing or stridor  Musculoskeletal:  Good upper extremity strength  Skin:  No rashes on the head and neck  Neurologic:  House Brackman 1/6 bilaterally, ambulating normally  Psychiatric:  Alert, normal affect, answering questions appropriately    Audiogram:  Right normal downsloping to mild sensorineural hearing loss with 100% speech discrimination, normal tympanogram and present reflexes.  Left normal/mild downsloping to moderate mixed loss with similar sensorineural thresholds as the right, 100% speech discrimination, normal tympanogram, intact reflexes.    Assessment and plan:  Left normal/mild downsloping to moderate conductive hearing loss with a normal ear exam.  We went over his audiogram and he had his questions answered about the test results.  I explained that his hearing is actually better than expected for his age in both ears, even the left with the added conductive hearing loss.  We discussed a hearing aid trial which he isn't particularly interested in since he isn't having much difficulty and especially after finding out that his hearing is quite good.  I did provide a hearing aid clearance letter in the event he would like to trial one.  Recommendation was made that he have another hearing test in several years to check on the left conductive hearing loss as it's possible he has  very early otosclerosis although he is certainly not a candidate for surgical treatment currently.    Again, thank you for allowing me to participate in the care of your patient.      Sincerely,    Leydi Woods MD

## 2020-01-20 NOTE — PROGRESS NOTES
Maite Hale is seen in consultation from Dr. Rangel.  He is a 69 year old male being seen for left sided hearing loss.  He actually isn't bothered by his hearing loss other than when he's in a noisy environment but his daughters had been making fun of him a little bit so he decided to come in to get it checked.  He reports he usually has no problems hearing but if he is out at a noisy restaurant or party, it can be difficulty.  No history of noise exposure.  No history of recurrent ear infections.  No current otalgia or otorrhea.  No vertigo or imbalance.    Past Medical History:   Diagnosis Date     Ascending aorta dilatation (H)     3.8     Hayfever      Lip edema     ? minoxodil       Past Surgical History:   Procedure Laterality Date     CATARACT IOL, RT/LT Right 10/12/15     COLONOSCOPY      repeat in      HAND SURGERY Right     fracture     HERNIA REPAIR Bilateral     mesh placed       Family History   Problem Relation Age of Onset     Neurologic Disorder Mother         Alzheimer, age 84     Neurologic Disorder Maternal Grandmother         Alzheimer     Diabetes Father         63      Hypertension Father         hx of elevated uric acid     Cerebrovascular Disease Father 63         of stroke     Macular Degeneration Brother      Macular Degeneration Brother      Eye Disorder Brother         Macular Degeneration     Neurologic Disorder Sister         Alzheimer, age 74     Cardiovascular Brother         pacemaker     Hypothyroidism Sister      Cancer - colorectal No family hx of      Prostate Cancer No family hx of      Glaucoma No family hx of        Social History     Tobacco Use     Smoking status: Never Smoker     Smokeless tobacco: Never Used   Substance Use Topics     Alcohol use: Yes     Alcohol/week: 0.0 standard drinks     Frequency: 2-3 times a week     Comment: occasionally     Drug use: No       Patient Supplied Answers to Review of Systems  The remainder of the 10  point review of systems is otherwise negative.    Physical examination:  Constitutional:  In no acute distress, appears stated age  Eyes:  Extraocular movements intact, no spontaneous nystagmus  Ears:  Both ears examined under the microscope.  Ear canals clear, TMs intact with well aerated middle ears.  Respiratory:  No increased work of breathing, wheezing or stridor  Musculoskeletal:  Good upper extremity strength  Skin:  No rashes on the head and neck  Neurologic:  House Brackman 1/6 bilaterally, ambulating normally  Psychiatric:  Alert, normal affect, answering questions appropriately    Audiogram:  Right normal downsloping to mild sensorineural hearing loss with 100% speech discrimination, normal tympanogram and present reflexes.  Left normal/mild downsloping to moderate mixed loss with similar sensorineural thresholds as the right, 100% speech discrimination, normal tympanogram, intact reflexes.    Assessment and plan:  Left normal/mild downsloping to moderate conductive hearing loss with a normal ear exam.  We went over his audiogram and he had his questions answered about the test results.  I explained that his hearing is actually better than expected for his age in both ears, even the left with the added conductive hearing loss.  We discussed a hearing aid trial which he isn't particularly interested in since he isn't having much difficulty and especially after finding out that his hearing is quite good.  I did provide a hearing aid clearance letter in the event he would like to trial one.  Recommendation was made that he have another hearing test in several years to check on the left conductive hearing loss as it's possible he has very early otosclerosis although he is certainly not a candidate for surgical treatment currently.

## 2020-02-03 ENCOUNTER — PRE VISIT (OUTPATIENT)
Dept: OTOLARYNGOLOGY | Facility: CLINIC | Age: 70
End: 2020-02-03

## 2020-05-05 ENCOUNTER — OFFICE VISIT (OUTPATIENT)
Dept: FAMILY MEDICINE | Facility: CLINIC | Age: 70
End: 2020-05-05
Payer: COMMERCIAL

## 2020-05-05 VITALS
TEMPERATURE: 98.5 F | HEART RATE: 108 BPM | BODY MASS INDEX: 24.49 KG/M2 | DIASTOLIC BLOOD PRESSURE: 83 MMHG | SYSTOLIC BLOOD PRESSURE: 137 MMHG | WEIGHT: 147 LBS | RESPIRATION RATE: 16 BRPM | OXYGEN SATURATION: 99 % | HEIGHT: 65 IN

## 2020-05-05 DIAGNOSIS — Z20.822 EXPOSURE TO COVID-19 VIRUS: ICD-10-CM

## 2020-05-05 DIAGNOSIS — T69.1XXA CHILBLAINS, INITIAL ENCOUNTER: Primary | ICD-10-CM

## 2020-05-05 ASSESSMENT — MIFFLIN-ST. JEOR: SCORE: 1368.05

## 2020-05-05 NOTE — NURSING NOTE
"69 year old  Chief Complaint   Patient presents with     Toe Pain     pain and redness, both feet,  both second toes, also little toe on the left, better after elevating       Blood pressure 137/83, pulse 108, temperature 98.5  F (36.9  C), temperature source Oral, resp. rate 16, height 1.658 m (5' 5.28\"), weight 66.7 kg (147 lb), SpO2 99 %. Body mass index is 24.26 kg/m .  Patient Active Problem List   Diagnosis     Advanced directives, counseling/discussion     Need for prophylactic vaccination and inoculation against influenza-refuses     Hyperlipidemia with target LDL less than 130     Nonsmoker     Acute seasonal allergic rhinitis     History of angioedema     Elevated TSH     Asymmetrical sensorineural hearing loss     Onychomycosis       Wt Readings from Last 2 Encounters:   05/05/20 66.7 kg (147 lb)   01/15/20 63.5 kg (140 lb)     BP Readings from Last 3 Encounters:   05/05/20 137/83   01/15/20 (!) 158/91   11/26/19 129/81         Current Outpatient Medications   Medication     Multiple Vitamins-Minerals (MULTI COMPLETE PO)     Omega-3 Fatty Acids (FISH OIL BURP-LESS PO)     Probiotic Product (SOLUBLE FIBER/PROBIOTICS PO)     VITAMIN D, CHOLECALCIFEROL, PO     Efinaconazole (JUBLIA) 10 % SOLN     No current facility-administered medications for this visit.        Social History     Tobacco Use     Smoking status: Never Smoker     Smokeless tobacco: Never Used   Substance Use Topics     Alcohol use: Yes     Alcohol/week: 0.0 standard drinks     Frequency: 2-3 times a week     Comment: occasionally     Drug use: No       Health Maintenance Due   Topic Date Due     ZOSTER IMMUNIZATION (1 of 2) 09/11/2001     PNEUMOCOCCAL IMMUNIZATION 65+ LOW/MEDIUM RISK (1 of 2 - PCV13) 09/11/2016     ADVANCE CARE PLANNING  10/26/2017     DTAP/TDAP/TD IMMUNIZATION (2 - Td) 05/04/2019     PHQ-2  01/01/2020     MEDICARE ANNUAL WELLNESS VISIT  04/08/2020       No results found for: PAP      May 5, 2020 3:59 PM    "

## 2020-05-06 NOTE — PROGRESS NOTES
"CHIEF COMPLAINT:  Toe pain and discoloration.      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  There is pain on the left foot in both the 5th toe and the 2nd toe as well as the 2nd toe on the right foot.  There is a purplish color to those 3 toes.  He feels a little better when he has elevated them.  The only thing he can think of as a causative event is that he has been doing some running, and there was one point when he was running rather fast downhill.  His 2nd toes extend past the 1st toes, so it is possible they were just bumping into the toe box.      He has had no COVID-like symptoms, although he was traveling fairly extensively before the middle of March.  He has had no classic symptoms such as fever, cough, chest tightness, fatigue, sore throat, muscle aches or loss of smell.      Recently, the daughter of a friend of the family who had seen him sent him an article about chilblains, a newly recognized possible sign/symptom of COVID infection.  He wonders if he might have that.      ACTIVE MEDICAL PROBLEMS:  Reviewed.      CURRENT MEDICATIONS:  Reviewed.      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  He is wearing a mask.  Blood pressure is 137/83 with a pulse of 108.  Temperature is 98.5.  He is 5 feet 5.3 inches in height and weighs 147 pounds with a BMI of 24.26.  O2 sats are 99% on room air.  Examination of the feet reveals outstanding dorsalis pedis pulses bilaterally.  The distal 2nd toe of the left foot as well as the distal 5th digit show a purple discoloration on the tips.  He is slightly tender with palpation of the dorsum of the 2nd toe.  The 2nd toe on the right foot also appears in a similar fashion, but it is not quite as tender.  No other discoloration of the feet is noted.  He did have a sensation that there were \"bumps\" under the bottom, and there is nothing I can feel today along the tendon sheaths.  The " feet otherwise look completely normal and healthy.      ASSESSMENT/PLAN:  Pain and discoloration of 3 toes on his feet.  The differential really comes down to 2 things.  I think this may either be some ecchymoses from running downhill and simply irritating his feet.  He is wearing a wider toe box shoe (Keen) presently, and things seem to be getting better with time.  On the other hand, this certainly could be chilblains given we are in the middle of the COVID pandemic.  Therefore, since he is here today, we are going to go ahead and check serum antibody levels for COVID-19.  He will be notified of the results.      Continue doing as he is doing.  No other changes or other recommendations at this time.

## 2020-05-07 LAB
COVID-19 SPIKE RBD ABY TITER: NORMAL
COVID-19 SPIKE RBD ABY: NEGATIVE

## 2020-05-13 DIAGNOSIS — S90.129D: Primary | ICD-10-CM

## 2020-05-20 DIAGNOSIS — I75.023 PURPLE TOE SYNDROME OF BOTH FEET (H): Primary | ICD-10-CM

## 2020-05-20 DIAGNOSIS — M79.676 PAIN OF FIFTH TOE: Primary | ICD-10-CM

## 2020-05-20 DIAGNOSIS — T69.1XXD CHILBLAIN LUPUS, SUBSEQUENT ENCOUNTER: ICD-10-CM

## 2020-05-20 DIAGNOSIS — M79.675 PAIN IN TOES OF BOTH FEET: ICD-10-CM

## 2020-05-20 DIAGNOSIS — M79.674 PAIN IN TOES OF BOTH FEET: ICD-10-CM

## 2020-05-20 NOTE — TELEPHONE ENCOUNTER
RECORDS RECEIVED FROM: Traumatic ecchymosis of toe-swollen and redness/Dr Rangel/no images/BCBS/ortho con/Covid screening passed   DATE RECEIVED: Jun 17, 2020   NOTES STATUS DETAILS   OFFICE NOTE from referring provider Internal  Bandar Rangel MD    OFFICE NOTE from other specialist N/A    DISCHARGE SUMMARY from hospital N/A    DISCHARGE REPORT from the ER N/A    OPERATIVE REPORT N/A    MEDICATION LIST Internal    IMPLANT RECORD/STICKER N/A    LABS     CBC/DIFF N/A    CULTURES N/A    INJECTIONS DONE IN RADIOLOGY N/A    MRI N/A    CT SCAN N/A    XRAYS (IMAGES & REPORTS) N/A    TUMOR     PATHOLOGY  Slides & report N/A      05/20/20   12:41 PM   Pre-visit complete  Meliza Mead CMA

## 2020-05-21 ENCOUNTER — ANCILLARY PROCEDURE (OUTPATIENT)
Dept: GENERAL RADIOLOGY | Facility: CLINIC | Age: 70
End: 2020-05-21
Attending: INTERNAL MEDICINE
Payer: COMMERCIAL

## 2020-05-21 DIAGNOSIS — M79.676 PAIN OF FIFTH TOE: ICD-10-CM

## 2020-05-22 ENCOUNTER — VIRTUAL VISIT (OUTPATIENT)
Dept: ORTHOPEDICS | Facility: CLINIC | Age: 70
End: 2020-05-22
Payer: COMMERCIAL

## 2020-05-22 DIAGNOSIS — Q74.2 LONG TOE: Primary | ICD-10-CM

## 2020-05-22 DIAGNOSIS — M79.675 PAIN AND SWELLING OF TOE OF LEFT FOOT: ICD-10-CM

## 2020-05-22 DIAGNOSIS — M79.89 PAIN AND SWELLING OF TOE OF LEFT FOOT: ICD-10-CM

## 2020-05-22 NOTE — PROGRESS NOTES
"Maite Hale is a 69 year old male who is being evaluated via a billable video visit.      The patient has been notified of following:     \"This video visit will be conducted via a call between you and your physician/provider. We have found that certain health care needs can be provided without the need for an in-person physical exam.  This service lets us provide the care you need with a video conversation.  If a prescription is necessary we can send it directly to your pharmacy.  If lab work is needed we can place an order for that and you can then stop by our lab to have the test done at a later time.    Video visits are billed at different rates depending on your insurance coverage.  Please reach out to your insurance provider with any questions.    If during the course of the call the physician/provider feels a video visit is not appropriate, you will not be charged for this service.\"    Patient has given verbal consent for Video visit? Yes    How would you like to obtain your AVS? Lorhart    Patient would like the video invitation sent by: Send to e-mail at: [email or web address]    Will anyone else be joining your video visit? No  {If patient encounters technical issues they should call [phone] :[id number]}      Video-Visit Details    Type of service:  Video Visit    Video Start Time: {video visit start/end time:152948}  Video End Time: {video visit start/end time:152948}    Originating Location (pt. Location): {video visit patient location:103865::\"Home\"}    Distant Location (provider location):  [facility]     Platform used for Video Visit: {Virtual Visit Platforms:059334::\"[employer]-Clients\"}    {signature options:737750}    sp  "

## 2020-05-22 NOTE — PROGRESS NOTES
"Maite Hale is a 69 year old male who is being evaluated via a billable video visit.      The patient has been notified of following:     \"This video visit will be conducted via a call between you and your physician/provider. We have found that certain health care needs can be provided without the need for an in-person physical exam.  This service lets us provide the care you need with a video conversation.  If a prescription is necessary we can send it directly to your pharmacy.  If lab work is needed we can place an order for that and you can then stop by our lab to have the test done at a later time.    Video visits are billed at different rates depending on your insurance coverage.  Please reach out to your insurance provider with any questions.    If during the course of the call the physician/provider feels a video visit is not appropriate, you will not be charged for this service.\"    Patient has given verbal consent for Video visit? Yes    How would you like to obtain your AVS? Lorhart    Patient would like the video invitation sent by: Send to e-mail at: [email or web address]    Will anyone else be joining your video visit? No        Video-Visit Details    Type of service:  Video Visit    Video Start Time: 9:10 AM  Video End Time: 9:49 PM    Originating Location (pt. Location): Home    Distant Location (provider location):  [facility]     Platform used for Video Visit: Fiordaliza Jolley DPM    Date of Service: 5/22/2020    Chief Complaint:   Chief Complaint   Patient presents with     Consult For     Swelling and redness, bilateral foot. Hx of broken left 5th toe.         HPI: Maite is a 69 year old male who presents today for further evaluation of painful left 2nd and 3rd toes and right 2nd toe. Relates that the toes have been red and painful after sprinting on asphalt a few weeks ago. Relates that he also went downhill and was using his toes to  to provide stability. He has had " COVID antibody testing and this was negative.     Review of Systems: No n/v/d/f/c/ns/sob/cp    PMH:   Past Medical History:   Diagnosis Date     Ascending aorta dilatation (H) 2015    3.8     Hayfever      Lip edema 1995    ? minoxodil       PSxH:   Past Surgical History:   Procedure Laterality Date     CATARACT IOL, RT/LT Right 10/12/15     COLONOSCOPY  2012    repeat in 2022     HAND SURGERY Right 1989    fracture     HERNIA REPAIR Bilateral 1990    mesh placed       Allergies: Sulfa drugs    SH:   Social History     Socioeconomic History     Marital status:      Spouse name: Not on file     Number of children: Not on file     Years of education: Not on file     Highest education level: Not on file   Occupational History     Not on file   Social Needs     Financial resource strain: Not on file     Food insecurity     Worry: Not on file     Inability: Not on file     Transportation needs     Medical: Not on file     Non-medical: Not on file   Tobacco Use     Smoking status: Never Smoker     Smokeless tobacco: Never Used   Substance and Sexual Activity     Alcohol use: Yes     Alcohol/week: 0.0 standard drinks     Frequency: 2-3 times a week     Comment: occasionally     Drug use: No     Sexual activity: Yes     Partners: Female   Lifestyle     Physical activity     Days per week: Not on file     Minutes per session: Not on file     Stress: Not on file   Relationships     Social connections     Talks on phone: Not on file     Gets together: Not on file     Attends Roman [employer] service: Not on file     Active member of club or organization: Not on file     Attends meetings of clubs or organizations: Not on file     Relationship status: Not on file     Intimate partner violence     Fear of current or ex partner: Not on file     Emotionally abused: Not on file     Physically abused: Not on file     Forced sexual activity: Not on file   Other Topics Concern     Not on file   Social History Narrative     Not on file        FH:   Family History   Problem Relation Age of Onset     Neurologic Disorder Mother         Alzheimer, age 84     Neurologic Disorder Maternal Grandmother         Alzheimer     Diabetes Father         63      Hypertension Father         hx of elevated uric acid     Cerebrovascular Disease Father 63         of stroke     Macular Degeneration Brother      Macular Degeneration Brother      Eye Disorder Brother         Macular Degeneration     Neurologic Disorder Sister         Alzheimer, age 74     Cardiovascular Brother         pacemaker     Hypothyroidism Sister      Cancer - colorectal No family hx of      Prostate Cancer No family hx of      Glaucoma No family hx of        Objective:  Data Unavailable Data Unavailable Data Unavailable Data Unavailable Data Unavailable 0 lbs 0 oz    The IPJs of the BL 2nd and left 3rd toes are slightly erythematous. Toes are considerably longer than the hallux. Left 5th digit is edematous from subacute proximal phalanx base fx.     No x-rays indicated during today's visit  Previous films were reviewed today, independent visualization of images was performed, and results were discussed with the patient      Assessment: Maite is a 68 YO runner with pain in the BL long 2nd digits. Likely 2/2 mechanics, as the toes are longer than the hallux and he does not wear long enough shoes. COVID antibody negative.     Plan:  - Pt seen and evaluated.  - Recommend silicone toe sleeves. Will send him a MyChart with an Amazon link for them.  - See again in clinic in 1 month.

## 2020-05-22 NOTE — LETTER
"    5/22/2020         RE: Maite Hale  [address] [place] [address]        Dear Colleague,    Thank you for referring your patient, Maite Hale, to the [facility]. Please see a copy of my visit note below.    Maite Hale is a 69 year old male who is being evaluated via a billable video visit.      The patient has been notified of following:     \"This video visit will be conducted via a call between you and your physician/provider. We have found that certain health care needs can be provided without the need for an in-person physical exam.  This service lets us provide the care you need with a video conversation.  If a prescription is necessary we can send it directly to your pharmacy.  If lab work is needed we can place an order for that and you can then stop by our lab to have the test done at a later time.    Video visits are billed at different rates depending on your insurance coverage.  Please reach out to your insurance provider with any questions.    If during the course of the call the physician/provider feels a video visit is not appropriate, you will not be charged for this service.\"    Patient has given verbal consent for Video visit? Yes    How would you like to obtain your AVS? MyChar    Patient would like the video invitation sent by: Send to e-mail at: [email or web address]    Will anyone else be joining your video visit? No        Video-Visit Details    Type of service:  Video Visit    Video Start Time: 9:10 AM  Video End Time: 9:49 PM    Originating Location (pt. Location): Home    Distant Location (provider location):  [facility]     Platform used for Video Visit: Fiordaliza Jolley DPM    Date of Service: 5/22/2020    Chief Complaint:   Chief Complaint   Patient presents with     Consult For     Swelling and redness, bilateral foot. Hx of broken left 5th toe.         HPI: Maite is a 69 year old male who presents today for further evaluation of " painful left 2nd and 3rd toes and right 2nd toe. Relates that the toes have been red and painful after sprinting on asphalt a few weeks ago. Relates that he also went downhill and was using his toes to  to provide stability. He has had COVID antibody testing and this was negative.     Review of Systems: No n/v/d/f/c/ns/sob/cp    PMH:   Past Medical History:   Diagnosis Date     Ascending aorta dilatation (H) 2015    3.8     Hayfever      Lip edema 1995    ? minoxodil       PSxH:   Past Surgical History:   Procedure Laterality Date     CATARACT IOL, RT/LT Right 10/12/15     COLONOSCOPY  2012    repeat in 2022     HAND SURGERY Right 1989    fracture     HERNIA REPAIR Bilateral 1990    mesh placed       Allergies: Sulfa drugs    SH:   Social History     Socioeconomic History     Marital status:      Spouse name: Not on file     Number of children: Not on file     Years of education: Not on file     Highest education level: Not on file   Occupational History     Not on file   Social Needs     Financial resource strain: Not on file     Food insecurity     Worry: Not on file     Inability: Not on file     Transportation needs     Medical: Not on file     Non-medical: Not on file   Tobacco Use     Smoking status: Never Smoker     Smokeless tobacco: Never Used   Substance and Sexual Activity     Alcohol use: Yes     Alcohol/week: 0.0 standard drinks     Frequency: 2-3 times a week     Comment: occasionally     Drug use: No     Sexual activity: Yes     Partners: Female   Lifestyle     Physical activity     Days per week: Not on file     Minutes per session: Not on file     Stress: Not on file   Relationships     Social connections     Talks on phone: Not on file     Gets together: Not on file     Attends [employer] service: Not on file     Active member of club or organization: Not on file     Attends meetings of clubs or organizations: Not on file     Relationship status: Not on file     Intimate partner violence      Fear of current or ex partner: Not on file     Emotionally abused: Not on file     Physically abused: Not on file     Forced sexual activity: Not on file   Other Topics Concern     Not on file   Social History Narrative     Not on file       FH:   Family History   Problem Relation Age of Onset     Neurologic Disorder Mother         Alzheimer, age 84     Neurologic Disorder Maternal Grandmother         Alzheimer     Diabetes Father         63      Hypertension Father         hx of elevated uric acid     Cerebrovascular Disease Father 63         of stroke     Macular Degeneration Brother      Macular Degeneration Brother      Eye Disorder Brother         Macular Degeneration     Neurologic Disorder Sister         Alzheimer, age 74     Cardiovascular Brother         pacemaker     Hypothyroidism Sister      Cancer - colorectal No family hx of      Prostate Cancer No family hx of      Glaucoma No family hx of        Objective:  Data Unavailable Data Unavailable Data Unavailable Data Unavailable Data Unavailable 0 lbs 0 oz    The IPJs of the BL 2nd and left 3rd toes are slightly erythematous. Toes are considerably longer than the hallux. Left 5th digit is edematous from subacute proximal phalanx base fx.     No x-rays indicated during today's visit  Previous films were reviewed today, independent visualization of images was performed, and results were discussed with the patient      Assessment: Maite is a 68 YO runner with pain in the BL long 2nd digits. Likely 2/2 mechanics, as the toes are longer than the hallux and he does not wear long enough shoes. COVID antibody negative.     Plan:  - Pt seen and evaluated.  - Recommend silicone toe sleeves. Will send him a MyChart with an Amazon link for them.  - See again in clinic in 1 month.              Again, thank you for allowing me to participate in the care of your patient.        Sincerely,        Gerard Jolley DPM

## 2020-06-17 ENCOUNTER — PRE VISIT (OUTPATIENT)
Dept: ORTHOPEDICS | Facility: CLINIC | Age: 70
End: 2020-06-17

## 2020-06-17 ENCOUNTER — ANCILLARY PROCEDURE (OUTPATIENT)
Dept: GENERAL RADIOLOGY | Facility: CLINIC | Age: 70
End: 2020-06-17
Attending: PODIATRIST
Payer: COMMERCIAL

## 2020-06-17 ENCOUNTER — OFFICE VISIT (OUTPATIENT)
Dept: ORTHOPEDICS | Facility: CLINIC | Age: 70
End: 2020-06-17
Attending: FAMILY MEDICINE
Payer: COMMERCIAL

## 2020-06-17 DIAGNOSIS — M54.17 LUMBOSACRAL RADICULOPATHY: Primary | ICD-10-CM

## 2020-06-17 DIAGNOSIS — S90.129D: ICD-10-CM

## 2020-06-17 DIAGNOSIS — M54.17 LUMBOSACRAL RADICULOPATHY: ICD-10-CM

## 2020-06-17 NOTE — LETTER
6/17/2020     RE: Maite Hale  [address] [place] [address]    Dear Colleague,    Thank you for referring your patient, Maite Hale, to the [facility]. Please see a copy of my visit note below.    Chief Complaint   Patient presents with     RECHECK     bilateral toe issues and  swelling             Allergies   Allergen Reactions     Sulfa Drugs      Facial swelling          Subjective: Maite is a 69 year old male who presents to the clinic today for a follow up of BL 2nd toe pain. He relates that the toe sleeves have been great at controlling his pain. He relates that he gets burning to both 2nd and 3rd digits that is intermittent and is improving. Relates that he does do back exercises sometimes. He has switched to running on softer surfaces, like wood chips and this has also been helpful.     Objective  Data Unavailable Data Unavailable Data Unavailable Data Unavailable Data Unavailable 0 lbs 0 oz  DP and PT pulses are 2/4 BL. CRT WNL.   Gross sensation slightly diminished at BL 2nd and 3rd digits. Protective sensation is intact as demonstrated by a 5.07G monofilament.   Equinus noted BL. Elongated 2nd and 3rd digits BL.   No open lesions.     Lumbar XR IMPRESSION: Mild degenerative changes of lumbar spine, particularly  L4-L5 and lower lumbar spine predominant facet arthropathies.     CATHERINE TAM    right foot xrays indicated in 3 weightbearing views.    No fractures. Normal alignment. No soft tissue abnormality.      Assessment: 2nd digit pain and redness - resolved.  Likely radiculopathy      Plan:   - Pt seen and evaluated  - Recommend continued use of the toe caps when running.  - Virtual PT for his back.   - Pt to return to clinic PRN.     Again, thank you for allowing me to participate in the care of your patient.      Sincerely,      Gerard Jolley DPM

## 2020-06-17 NOTE — PROGRESS NOTES
Chief Complaint   Patient presents with     RECHECK     bilateral toe issues and  swelling             Allergies   Allergen Reactions     Sulfa Drugs      Facial swelling          Subjective: Maite is a 69 year old male who presents to the clinic today for a follow up of BL 2nd toe pain. He relates that the toe sleeves have been great at controlling his pain. He relates that he gets burning to both 2nd and 3rd digits that is intermittent and is improving. Relates that he does do back exercises sometimes. He has switched to running on softer surfaces, like wood chips and this has also been helpful.     Objective  Data Unavailable Data Unavailable Data Unavailable Data Unavailable Data Unavailable 0 lbs 0 oz  DP and PT pulses are 2/4 BL. CRT WNL.   Gross sensation slightly diminished at BL 2nd and 3rd digits. Protective sensation is intact as demonstrated by a 5.07G monofilament.   Equinus noted BL. Elongated 2nd and 3rd digits BL.   No open lesions.     Lumbar XR IMPRESSION: Mild degenerative changes of lumbar spine, particularly  L4-L5 and lower lumbar spine predominant facet arthropathies.     CATHERINE TAM    right foot xrays indicated in 3 weightbearing views.    No fractures. Normal alignment. No soft tissue abnormality.      Assessment: 2nd digit pain and redness - resolved.  Likely radiculopathy      Plan:   - Pt seen and evaluated  - Recommend continued use of the toe caps when running.  - Virtual PT for his back.   - Pt to return to clinic PRN.

## 2020-06-17 NOTE — NURSING NOTE
Reason For Visit:   Chief Complaint   Patient presents with     RECHECK     bilateral toe issues and  swelling        There were no vitals taken for this visit.    Pain Assessment  Patient Currently in Pain: No(no pain)    Yasemin Rivera ATC

## 2020-06-29 NOTE — TELEPHONE ENCOUNTER
FUTURE VISIT INFORMATION      FUTURE VISIT INFORMATION:    Date: 7/20/2020    Time: 11:45AM    [facility] [facility]  REFERRAL INFORMATION:    Referring provider:  Dr Bandar Rangel    Referring providers clinic:  [facility]    Reason for visit/diagnosis  swollen tissue in nostrils      RECORDS REQUESTED FROM:         Clinic name Comments Records Status Imaging Status   [facility] 10/14/19, 2/18/19 notes with Dr Rangel EPIC      Imaging 12/2/19 MR Brain  [facility] PACS

## 2020-07-02 ENCOUNTER — THERAPY VISIT (OUTPATIENT)
Dept: PHYSICAL THERAPY | Facility: CLINIC | Age: 70
End: 2020-07-02
Payer: COMMERCIAL

## 2020-07-02 DIAGNOSIS — M54.17 LUMBOSACRAL RADICULOPATHY: ICD-10-CM

## 2020-07-02 PROCEDURE — 97161 PT EVAL LOW COMPLEX 20 MIN: CPT | Mod: GP | Performed by: PHYSICAL THERAPIST

## 2020-07-02 PROCEDURE — 97110 THERAPEUTIC EXERCISES: CPT | Mod: GP | Performed by: PHYSICAL THERAPIST

## 2020-07-02 NOTE — PROGRESS NOTES
[facility] Initial Evaluation  Subjective:  The history is provided by the patient.   C/C: Patient is a 69-year-old man with complaints of mainly left greater than right toe pain.  Pain is mainly centered in the distal region of toes 2 and 3.  Describes pain as a throbbing, hot, inflamed feeling.  Intensity of pain has significantly improved over time.  Currently pain is aggravated especially with running.name he will.  Main relieving factor was acquiring shoes that were a bigger size.  Also will note intermittent back pain.  Back pain can be associated with exercise or other activities that load his low back.  No change in sensation.  No reported red flags.  Feels condition is improving.  Hx:  3/2020-started to note current symptoms with running.  Found symptoms were most aggravated with downhill running.  Spoke with MD and it was felt that symptoms in the feet could be related to his low back.  Imaging to his low back reveal degenerative changes.  Please see epic.  Patient's feet were also examined.  See x-rays for feet as well in epic.  PMH: Patient reports noting low back pain 2 years ago.  12/19-patient had traveled to [place] and started running there.  He then stopped for period of time and resumed running 3/2020.  Pt tends to run 2-3x/wk mainly in the warmer months, then in winter does more eliptical, other ex.  General health:  See Epic.  Pt is a professor.                    Objective:    Gait:    Gait Type:  Normal                    Lumbar/SI Evaluation  ROM:    AROM Lumbar:   Flexion:          Fingers to feet (-)  Ext:                    75% -stiff, but not painful   Side Bend:        Left:  WNL    Right:  WNL  Rotation:           Left:     Right:   Side Glide:        Left:     Right:           Lumbar Myotomes:  Lumbar myotomes: No complaint of weakness.                  Neural Tension/Mobility:      Left side:SLR; SLR w/DF or Yaw-Lumbar  negative.     Right side:   SLR w/DF;  Yaw-Lumbar or SLR  negative.   Lumbar Palpation:  Palpation (lumbar): Does have local tenderness at 3rd DIP in both feet.  Very slight reddness noted in same region.                                                         General     ROS    Assessment/Plan:    Patient is a 69 year old male with lumbar complaints.    Patient has the following significant findings with corresponding treatment plan.                Diagnosis 1:  Lumbar radiculopathy  Pain -  self management, education and home program  Decreased ROM/flexibility - manual therapy and therapeutic exercise  Decreased joint mobility - manual therapy and therapeutic exercise  Decreased strength - therapeutic exercise and therapeutic activities  Impaired gait - gait training  Impaired muscle performance - neuro re-education  Decreased function - therapeutic activities    Therapy Evaluation Codes:   .Cumulative Therapy Evaluation is: Low complexity.    Previous and current functional limitations:  (See Goal Flow Sheet for this information)    Short term and Long term goals: (See Goal Flow Sheet for this information)     Communication ability:  Patient appears to be able to clearly communicate and understand verbal and written communication and follow directions correctly.  Treatment Explanation - The following has been discussed with the patient:   RX ordered/plan of care  Anticipated outcomes  Possible risks and side effects  This patient would benefit from PT intervention to resume normal activities.   Rehab potential is excellent.    Frequency:  1 X week, once daily  Duration:  for 4 weeks  Discharge Plan:  Achieve all LTG.  Independent in home treatment program.  Reach maximal therapeutic benefit.    Please refer to the daily flowsheet for treatment today, total treatment time and time spent performing 1:1 timed codes.

## 2020-07-20 ENCOUNTER — PRE VISIT (OUTPATIENT)
Dept: OTOLARYNGOLOGY | Facility: CLINIC | Age: 70
End: 2020-07-20

## 2020-07-20 ENCOUNTER — OFFICE VISIT (OUTPATIENT)
Dept: OTOLARYNGOLOGY | Facility: CLINIC | Age: 70
End: 2020-07-20
Payer: COMMERCIAL

## 2020-07-20 VITALS — WEIGHT: 165 LBS | BODY MASS INDEX: 27.23 KG/M2 | HEART RATE: 91 BPM | TEMPERATURE: 98 F | OXYGEN SATURATION: 99 %

## 2020-07-20 DIAGNOSIS — J34.2 DEVIATED NASAL SEPTUM: Primary | ICD-10-CM

## 2020-07-20 ASSESSMENT — PAIN SCALES - GENERAL: PAINLEVEL: NO PAIN (0)

## 2020-07-20 NOTE — NURSING NOTE
Chief Complaint   Patient presents with     Consult     Difficulty breathing         Pulse 91, temperature 98  F (36.7  C), temperature source Temporal, weight 74.8 kg (165 lb), SpO2 99 %.    Erika Bernal, EMT

## 2020-07-20 NOTE — PATIENT INSTRUCTIONS
You were seen in the ENT clinic today with Dr. Marino    Recommendations for you:    -Consider Revision Septoplasty and turbinate reduction   -Continue Flonase      Please call our clinic for any questions, concerns, and/or worsening symptoms.      Clinic #[phone]       Option 1 for scheduling.    Thank you for allowing us to be apart of your care!    Ana María JUNIOR RNCC    If you need to reach me my direct line is: [phone]

## 2020-07-20 NOTE — LETTER
2020       RE: Maite Hale  [address] [place] [address]     Dear Colleague,    Thank you for referring your patient, Maite Hale, to the [facility] at [facility]. Please see a copy of my visit note below.    Otolaryngology Adult Consultation    Patient: Maite Hale   : 1950     Patient presents with:  Consult:   Chief Complaint   Patient presents with     Consult     Difficulty breathing        Referring Provider: Bandar Rangel   Consulting Physician:  Dariela Marino MD       Assessment/Plan: Patient with deviated septum and enlarged nasal turbinate. Will try trial of nasal steroids. Discussed option of surgery which he will consider - and let us know whether he would like to proceed.      HPI: Maite Hale is a 69 year old male seen today in the Otolaryngology Clinic in consultation from Dr. Rangel for a history of an abnormal nasal exam.  Symptoms include worsening nasal obstruction over past several years, worse when laying down. Duration of symptoms has been years. Previous medical therapies tried and their effects include short term antihistamines and nasal steroids for allergy symptoms in  - some benefit. Previous surgery performed includes septoplasty age 21. Associated lower respiratory symptoms are none.      Previous imaging for ear issues shows nasal sinuses are clear, deviated septum and enlarged turbinate.    Efinaconazole (JUBLIA) 10 % SOLN, Externally apply topically daily For 48 weeks. (Patient not taking: Reported on 2020)  Multiple Vitamins-Minerals (MULTI COMPLETE PO), Take 2 capsules by mouth once a week   Omega-3 Fatty Acids (FISH OIL BURP-LESS PO),   Probiotic Product (SOLUBLE FIBER/PROBIOTICS PO), Take 1 capful by mouth daily  VITAMIN D, CHOLECALCIFEROL, PO, Take 2,000 Units by mouth daily    No current facility-administered medications on file prior to visit.          Allergies   Allergen  Reactions     Seasonal Allergies      Sulfa Drugs      Facial swelling        Past Medical History:   Diagnosis Date     Ascending aorta dilatation (H) 2015    3.8     Hayfever      Lip edema 1995    ? minoxodil       Social History     Occupational History     Not on file   Tobacco Use     Smoking status: Never Smoker     Smokeless tobacco: Never Used   Substance and Sexual Activity     Alcohol use: Yes     Alcohol/week: 0.0 standard drinks     Frequency: 2-3 times a week     Comment: occasionally     Drug use: No     Sexual activity: Yes     Partners: Female        Review of Systems  No flowsheet data found.     14 point ROS neg other than the symptoms noted above.    Physical Exam:    General Assessment   The patient is alert, oriented and in no acute distress.   Head/Face/Scalp  Grossly normal.   Nose  External nose is straight, skin is normal. Intranasal exam (anterior rhinoscopy) reveals normal moist mucosa, turbinate tissue without edema, erythema or crusting.  Septum deviated to left with compensatory hypertrophy of R inferior turbinate        Again, thank you for allowing me to participate in the care of your patient.      Sincerely,    Dariela Marino MD

## 2020-07-20 NOTE — PROGRESS NOTES
Otolaryngology Adult Consultation    Patient: Maite Hale   : 1950     Patient presents with:  Consult:   Chief Complaint   Patient presents with     Consult     Difficulty breathing        Referring Provider: Bandar Rangel   Consulting Physician:  Dariela Marino MD       Assessment/Plan: Patient with deviated septum and enlarged nasal turbinate. Will try trial of nasal steroids. Discussed option of surgery which he will consider - and let us know whether he would like to proceed.      HPI: Maite Hale is a 69 year old male seen today in the Otolaryngology Clinic in consultation from Dr. Rangel for a history of an abnormal nasal exam.  Symptoms include worsening nasal obstruction over past several years, worse when laying down. Duration of symptoms has been years. Previous medical therapies tried and their effects include short term antihistamines and nasal steroids for allergy symptoms in  - some benefit. Previous surgery performed includes septoplasty age 21. Associated lower respiratory symptoms are none.      Previous imaging for ear issues shows nasal sinuses are clear, deviated septum and enlarged turbinate.    Efinaconazole (JUBLIA) 10 % SOLN, Externally apply topically daily For 48 weeks. (Patient not taking: Reported on 2020)  Multiple Vitamins-Minerals (MULTI COMPLETE PO), Take 2 capsules by mouth once a week   Omega-3 Fatty Acids (FISH OIL BURP-LESS PO),   Probiotic Product (SOLUBLE FIBER/PROBIOTICS PO), Take 1 capful by mouth daily  VITAMIN D, CHOLECALCIFEROL, PO, Take 2,000 Units by mouth daily    No current facility-administered medications on file prior to visit.          Allergies   Allergen Reactions     Seasonal Allergies      Sulfa Drugs      Facial swelling        Past Medical History:   Diagnosis Date     Ascending aorta dilatation (H)     3.8     Hayfever      Lip edema     ? minoxodil       Social History     Occupational History     Not on file   Tobacco Use      Smoking status: Never Smoker     Smokeless tobacco: Never Used   Substance and Sexual Activity     Alcohol use: Yes     Alcohol/week: 0.0 standard drinks     Frequency: 2-3 times a week     Comment: occasionally     Drug use: No     Sexual activity: Yes     Partners: Female        Review of Systems  No flowsheet data found.     14 point ROS neg other than the symptoms noted above.    Physical Exam:    General Assessment   The patient is alert, oriented and in no acute distress.   Head/Face/Scalp  Grossly normal.   Nose  External nose is straight, skin is normal. Intranasal exam (anterior rhinoscopy) reveals normal moist mucosa, turbinate tissue without edema, erythema or crusting.  Septum deviated to left with compensatory hypertrophy of R inferior turbinate

## 2020-08-03 DIAGNOSIS — H35.371 EPIRETINAL MEMBRANE (ERM) OF RIGHT EYE: Primary | ICD-10-CM

## 2020-09-28 NOTE — PROGRESS NOTES
Chief Complaint   Patient presents with     Follow Up     Patient stated that he is here for slightly different but related issue.           Allergies   Allergen Reactions     Seasonal Allergies      Sulfa Drugs      Facial swelling          Subjective: Maite is a 70 year old male who presents to the clinic today for a follow up of right 2nd black nail. Relates that he was hiking in [place] and descended a hill rapidly and inflamed the toe. Relates that the toe is no longer painful. Wondering if he needs further tx.    Objective  Data Unavailable Data Unavailable Data Unavailable Data Unavailable Data Unavailable 0 lbs 0 oz  Subungual hematoma noted to the right 2nd nail toenail. No pain noted with palpation of the area. No s/s of infection noted. Nail tightly adhered.     Assessment: Right 2nd subungual ecchymosis.     Plan:   - Pt seen and evaluated  - Recommend that he just let the nail grow out.   - Pt to return to clinic PRN.

## 2020-09-30 ENCOUNTER — OFFICE VISIT (OUTPATIENT)
Dept: ORTHOPEDICS | Facility: CLINIC | Age: 70
End: 2020-09-30
Payer: COMMERCIAL

## 2020-09-30 DIAGNOSIS — S90.221A SUBUNGUAL HEMATOMA OF RIGHT FOOT, INITIAL ENCOUNTER: Primary | ICD-10-CM

## 2020-09-30 NOTE — NURSING NOTE
Reason For Visit:   Chief Complaint   Patient presents with     Follow Up     Patient stated that he is here for slightly different but related issue.        Pain Assessment  Patient Currently in Pain: Denies        Allergies   Allergen Reactions     Seasonal Allergies      Sulfa Drugs      Facial swelling            Cassandra Hinds LPN

## 2020-09-30 NOTE — LETTER
9/30/2020         RE: Maite Hale  [address]  [place] [address]        Dear Colleague,    Thank you for referring your patient, Maite Hale, to the [facility]. Please see a copy of my visit note below.    Chief Complaint   Patient presents with     Follow Up     Patient stated that he is here for slightly different but related issue.           Allergies   Allergen Reactions     Seasonal Allergies      Sulfa Drugs      Facial swelling          Subjective: Maite is a 70 year old male who presents to the clinic today for a follow up of right 2nd black nail. Relates that he was hiking in [place] and descended a hill rapidly and inflamed the toe. Relates that the toe is no longer painful. Wondering if he needs further tx.    Objective  Data Unavailable Data Unavailable Data Unavailable Data Unavailable Data Unavailable 0 lbs 0 oz  Subungual hematoma noted to the right 2nd nail toenail. No pain noted with palpation of the area. No s/s of infection noted. Nail tightly adhered.     Assessment: Right 2nd subungual ecchymosis.     Plan:   - Pt seen and evaluated  - Recommend that he just let the nail grow out.   - Pt to return to clinic PRN.               Gerard Jolley DPM

## 2020-11-07 ENCOUNTER — HEALTH MAINTENANCE LETTER (OUTPATIENT)
Age: 70
End: 2020-11-07

## 2020-11-30 ENCOUNTER — MYC MEDICAL ADVICE (OUTPATIENT)
Dept: FAMILY MEDICINE | Facility: CLINIC | Age: 70
End: 2020-11-30

## 2020-11-30 DIAGNOSIS — K64.9 HEMORRHOIDS, UNSPECIFIED HEMORRHOID TYPE: Primary | ICD-10-CM

## 2020-12-02 ENCOUNTER — TELEPHONE (OUTPATIENT)
Dept: SURGERY | Facility: CLINIC | Age: 70
End: 2020-12-02

## 2020-12-09 ENCOUNTER — TRANSFERRED RECORDS (OUTPATIENT)
Dept: HEALTH INFORMATION MANAGEMENT | Facility: CLINIC | Age: 70
End: 2020-12-09

## 2020-12-09 ENCOUNTER — MYC MEDICAL ADVICE (OUTPATIENT)
Dept: FAMILY MEDICINE | Facility: CLINIC | Age: 70
End: 2020-12-09

## 2020-12-14 NOTE — TELEPHONE ENCOUNTER
"Patient intake call for \"hemorrhoids\".    Called to assess symptoms.    Patient answered phone call and after I introduced myself he stated \"Let me tell you something\", and proceeded to explain that his referral was placed 2 weeks ago and he was very unhappy with [facility]. He saw a different physician for this and has other health issues and will go somewhere else.    I tried to apologize and explain that we are stretched very thin and COVID-19 has only made working our referral pool worse.    Patient hung up phone call.    Michelle Warner, EMT  Colon and Rectal Surgery    "

## 2020-12-16 ENCOUNTER — TRANSFERRED RECORDS (OUTPATIENT)
Dept: HEALTH INFORMATION MANAGEMENT | Facility: CLINIC | Age: 70
End: 2020-12-16

## 2021-01-12 ENCOUNTER — TRANSFERRED RECORDS (OUTPATIENT)
Dept: HEALTH INFORMATION MANAGEMENT | Facility: CLINIC | Age: 71
End: 2021-01-12

## 2021-03-16 ENCOUNTER — OFFICE VISIT (OUTPATIENT)
Dept: FAMILY MEDICINE | Facility: CLINIC | Age: 71
End: 2021-03-16
Payer: COMMERCIAL

## 2021-03-16 VITALS
BODY MASS INDEX: 22.91 KG/M2 | TEMPERATURE: 97 F | OXYGEN SATURATION: 99 % | SYSTOLIC BLOOD PRESSURE: 137 MMHG | HEART RATE: 99 BPM | RESPIRATION RATE: 15 BRPM | WEIGHT: 146 LBS | HEIGHT: 67 IN | DIASTOLIC BLOOD PRESSURE: 80 MMHG

## 2021-03-16 DIAGNOSIS — Z13.220 SCREENING FOR HYPERLIPIDEMIA: ICD-10-CM

## 2021-03-16 DIAGNOSIS — Z12.5 SCREENING FOR PROSTATE CANCER: ICD-10-CM

## 2021-03-16 DIAGNOSIS — T69.1XXD CHILBLAIN LUPUS, SUBSEQUENT ENCOUNTER: ICD-10-CM

## 2021-03-16 DIAGNOSIS — M79.675 PAIN IN TOES OF BOTH FEET: ICD-10-CM

## 2021-03-16 DIAGNOSIS — M79.674 PAIN IN TOES OF BOTH FEET: ICD-10-CM

## 2021-03-16 DIAGNOSIS — I75.023 PURPLE TOE SYNDROME OF BOTH FEET (H): ICD-10-CM

## 2021-03-16 DIAGNOSIS — Z83.3 FAMILY HISTORY OF DIABETES MELLITUS IN FATHER: ICD-10-CM

## 2021-03-16 DIAGNOSIS — Z00.00 MEDICARE ANNUAL WELLNESS VISIT, SUBSEQUENT: Primary | ICD-10-CM

## 2021-03-16 DIAGNOSIS — R79.89 ELEVATED TSH: ICD-10-CM

## 2021-03-16 LAB
BASOPHILS # BLD AUTO: 0 10E9/L (ref 0–0.2)
BASOPHILS NFR BLD AUTO: 0.7 %
CHOLEST SERPL-MCNC: 178 MG/DL (ref 0–200)
CHOLEST/HDLC SERPL: 4 {RATIO} (ref 0–5)
DIFFERENTIAL METHOD BLD: NORMAL
EOSINOPHIL # BLD AUTO: 0.2 10E9/L (ref 0–0.7)
EOSINOPHIL NFR BLD AUTO: 2.6 %
ERYTHROCYTE [DISTWIDTH] IN BLOOD BY AUTOMATED COUNT: 12.6 % (ref 10–15)
FASTING SPECIMEN: NO
HBA1C MFR BLD: 5.7 % (ref 4.1–5.7)
HCT VFR BLD AUTO: 42.2 % (ref 40–53)
HDLC SERPL-MCNC: 45 MG/DL
HGB BLD-MCNC: 13.7 G/DL (ref 13.3–17.7)
IMM GRANULOCYTES # BLD: 0 10E9/L (ref 0–0.4)
IMM GRANULOCYTES NFR BLD: 0.2 %
LDLC SERPL CALC-MCNC: 85 MG/DL (ref 0–129)
LYMPHOCYTES # BLD AUTO: 1.4 10E9/L (ref 0.8–5.3)
LYMPHOCYTES NFR BLD AUTO: 23.4 %
MCH RBC QN AUTO: 29 PG (ref 26.5–33)
MCHC RBC AUTO-ENTMCNC: 32.5 G/DL (ref 31.5–36.5)
MCV RBC AUTO: 89 FL (ref 78–100)
MONOCYTES # BLD AUTO: 0.4 10E9/L (ref 0–1.3)
MONOCYTES NFR BLD AUTO: 6.1 %
NEUTROPHILS # BLD AUTO: 4.1 10E9/L (ref 1.6–8.3)
NEUTROPHILS NFR BLD AUTO: 67 %
NRBC # BLD AUTO: 0 10*3/UL
NRBC BLD AUTO-RTO: 0 /100
PLATELET # BLD AUTO: 234 10E9/L (ref 150–450)
PSA SERPL-ACNC: 1.25 UG/L (ref 0–4)
RBC # BLD AUTO: 4.73 10E12/L (ref 4.4–5.9)
TRIGL SERPL-MCNC: 246 MG/DL (ref 0–150)
URATE SERPL-MCNC: 5.5 MG/DL (ref 3.5–7.2)
VLDL-CHOLESTEROL: 49 (ref 7–32)
WBC # BLD AUTO: 6.1 10E9/L (ref 4–11)

## 2021-03-16 RX ORDER — ASPIRIN 81 MG/1
81 TABLET, CHEWABLE ORAL DAILY
COMMUNITY
End: 2021-07-22

## 2021-03-16 SDOH — HEALTH STABILITY: MENTAL HEALTH: HOW MANY STANDARD DRINKS CONTAINING ALCOHOL DO YOU HAVE ON A TYPICAL DAY?: 1 OR 2

## 2021-03-16 SDOH — HEALTH STABILITY: MENTAL HEALTH: HOW OFTEN DO YOU HAVE 6 OR MORE DRINKS ON ONE OCCASION?: NOT ASKED

## 2021-03-16 ASSESSMENT — MIFFLIN-ST. JEOR: SCORE: 1377.94

## 2021-03-16 NOTE — NURSING NOTE
"70 year old  Chief Complaint   Patient presents with     Physical     and colonoscopy      Toe Pain     swollen toes        Blood pressure 139/86, pulse 99, temperature 97  F (36.1  C), temperature source Skin, resp. rate 15, height 1.689 m (5' 6.5\"), weight 66.2 kg (146 lb), SpO2 99 %. Body mass index is 23.21 kg/m .  Patient Active Problem List   Diagnosis     Advanced directives, counseling/discussion     Need for prophylactic vaccination and inoculation against influenza-refuses     Hyperlipidemia with target LDL less than 130     Nonsmoker     Acute seasonal allergic rhinitis     History of angioedema     Elevated TSH     Asymmetrical sensorineural hearing loss     Onychomycosis     Lumbosacral radiculopathy       Wt Readings from Last 2 Encounters:   03/16/21 66.2 kg (146 lb)   07/20/20 74.8 kg (165 lb)     BP Readings from Last 3 Encounters:   03/16/21 139/86   05/05/20 137/83   01/15/20 (!) 158/91         Current Outpatient Medications   Medication     aspirin (ASA) 81 MG chewable tablet     melatonin 1 MG TABS tablet     Multiple Vitamins-Minerals (MULTI COMPLETE PO)     Omega-3 Fatty Acids (FISH OIL BURP-LESS PO)     Probiotic Product (SOLUBLE FIBER/PROBIOTICS PO)     VITAMIN D, CHOLECALCIFEROL, PO     Efinaconazole (JUBLIA) 10 % SOLN     No current facility-administered medications for this visit.        Social History     Tobacco Use     Smoking status: Never Smoker     Smokeless tobacco: Never Used   Substance Use Topics     Alcohol use: Yes     Frequency: 2-3 times a week     Drinks per session: 1 or 2     Comment: occasionally     Drug use: No       Health Maintenance Due   Topic Date Due     ZOSTER IMMUNIZATION (1 of 2) Never done     Pneumococcal Vaccine: 65+ Years (1 of 1 - PPSV23) Never done     ADVANCE CARE PLANNING  10/26/2017     DTAP/TDAP/TD IMMUNIZATION (2 - Td) 05/04/2019     MEDICARE ANNUAL WELLNESS VISIT  04/08/2020     INFLUENZA VACCINE (1) 09/01/2020     FALL RISK ASSESSMENT  12/11/2020 "       No results found for: PAP      March 16, 2021 2:26 PM

## 2021-03-17 LAB
ALBUMIN SERPL ELPH-MCNC: 4.4 G/DL (ref 3.7–5.1)
ALPHA1 GLOB SERPL ELPH-MCNC: 0.3 G/DL (ref 0.2–0.4)
ALPHA2 GLOB SERPL ELPH-MCNC: 0.6 G/DL (ref 0.5–0.9)
ANA SER QL IF: NEGATIVE
B-GLOBULIN SERPL ELPH-MCNC: 0.7 G/DL (ref 0.6–1)
GAMMA GLOB SERPL ELPH-MCNC: 1.3 G/DL (ref 0.7–1.6)
M PROTEIN SERPL ELPH-MCNC: 0 G/DL
PROT PATTERN SERPL ELPH-IMP: NORMAL

## 2021-03-18 ENCOUNTER — MYC MEDICAL ADVICE (OUTPATIENT)
Dept: FAMILY MEDICINE | Facility: CLINIC | Age: 71
End: 2021-03-18

## 2021-03-18 DIAGNOSIS — I73.00 RAYNAUD'S SYNDROME: Primary | ICD-10-CM

## 2021-03-18 DIAGNOSIS — E78.5 HYPERLIPIDEMIA WITH TARGET LDL LESS THAN 130: ICD-10-CM

## 2021-03-18 DIAGNOSIS — H90.3 ASYMMETRICAL SENSORINEURAL HEARING LOSS: ICD-10-CM

## 2021-03-22 ENCOUNTER — MYC MEDICAL ADVICE (OUTPATIENT)
Dept: FAMILY MEDICINE | Facility: CLINIC | Age: 71
End: 2021-03-22

## 2021-03-22 ENCOUNTER — TELEPHONE (OUTPATIENT)
Dept: FAMILY MEDICINE | Facility: CLINIC | Age: 71
End: 2021-03-22

## 2021-03-22 DIAGNOSIS — E78.1 HYPERTRIGLYCERIDEMIA: ICD-10-CM

## 2021-03-22 DIAGNOSIS — R10.84 ABDOMINAL PAIN, GENERALIZED: ICD-10-CM

## 2021-03-22 DIAGNOSIS — Z00.00 MEDICARE ANNUAL WELLNESS VISIT, SUBSEQUENT: Primary | ICD-10-CM

## 2021-03-22 DIAGNOSIS — I75.023 PURPLE TOE SYNDROME OF BOTH FEET (H): ICD-10-CM

## 2021-03-22 LAB — CRYOGLOB SER QL: NORMAL %

## 2021-03-22 NOTE — TELEPHONE ENCOUNTER
Who is calling? Patient  Reason for Call: Calling to relay that he called [facility] to schedule his labs and they could not see the lipid panel order - advised patient to call Shelly back to double check. Also requesting to speak with Jyotsna further about TSH lab.

## 2021-03-22 NOTE — TELEPHONE ENCOUNTER
Who is calling? Patient    Reason for Call: patient upset because Dr. CHIN has not responded to his my chart. Wants a call back from the nurses.    Anupama

## 2021-03-22 NOTE — TELEPHONE ENCOUNTER
See below.     1. I think we should refer him to [facility].  Let's call it [facility].  (There really isn't a diagnosis for what he has...)     2. He does not need to see a physician for a hearing aid.  He needs to have an audiogram within 12 months, so he might need that.     3. Yes, we can re-test his lipid panel, though I told him that wasn't a big concern...  High glucose levels can cause high triglycerides--and visa versa.  (I'm not going to read the article.)     Any more questions and this needs to be a visit!     Bandar

## 2021-03-22 NOTE — TELEPHONE ENCOUNTER
Called patient and spoke to him about the labs ordered.     Jyotsna Troncoso RN  03/22/21  5:02 PM

## 2021-03-22 NOTE — TELEPHONE ENCOUNTER
Orders already placed and patient notified via [facility].   Jyotsna Troncoso RN  03/22/21  11:10 AM

## 2021-03-22 NOTE — TELEPHONE ENCOUNTER
[facility]'s [facility] tests:     TSH (hypertriglyceridemia); TTG antibody (Dx; Abdominal pain); and cryoglobulins (dusky toes; we might need to find a better diagnosis).     Thanks.     Bandar

## 2021-03-24 ENCOUNTER — HOSPITAL ENCOUNTER (OUTPATIENT)
Dept: LAB | Facility: CLINIC | Age: 71
Discharge: HOME | End: 2021-03-24
Attending: FAMILY MEDICINE | Admitting: FAMILY MEDICINE
Payer: COMMERCIAL

## 2021-03-24 DIAGNOSIS — E78.1 HYPERTRIGLYCERIDEMIA: ICD-10-CM

## 2021-03-24 DIAGNOSIS — E78.5 HYPERLIPIDEMIA WITH TARGET LDL LESS THAN 130: ICD-10-CM

## 2021-03-24 DIAGNOSIS — R79.89 ELEVATED TSH: Primary | ICD-10-CM

## 2021-03-24 DIAGNOSIS — R10.84 ABDOMINAL PAIN, GENERALIZED: ICD-10-CM

## 2021-03-24 DIAGNOSIS — I75.023 PURPLE TOE SYNDROME OF BOTH FEET (H): ICD-10-CM

## 2021-03-24 LAB
CHOLEST SERPL-MCNC: 166 MG/DL
HDLC SERPL-MCNC: 52 MG/DL
LDLC SERPL CALC-MCNC: 99 MG/DL
NONHDLC SERPL-MCNC: 114 MG/DL
T4 FREE SERPL-MCNC: 0.78 NG/DL (ref 0.76–1.46)
TRIGL SERPL-MCNC: 77 MG/DL
TSH SERPL DL<=0.005 MIU/L-ACNC: 4.56 MU/L (ref 0.4–4)

## 2021-03-24 PROCEDURE — 84439 ASSAY OF FREE THYROXINE: CPT | Performed by: FAMILY MEDICINE

## 2021-03-24 PROCEDURE — 83516 IMMUNOASSAY NONANTIBODY: CPT | Performed by: FAMILY MEDICINE

## 2021-03-24 PROCEDURE — 84443 ASSAY THYROID STIM HORMONE: CPT | Performed by: FAMILY MEDICINE

## 2021-03-24 PROCEDURE — 36415 COLL VENOUS BLD VENIPUNCTURE: CPT | Performed by: FAMILY MEDICINE

## 2021-03-24 PROCEDURE — 80061 LIPID PANEL: CPT | Performed by: FAMILY MEDICINE

## 2021-03-24 PROCEDURE — 82595 ASSAY OF CRYOGLOBULIN: CPT | Performed by: FAMILY MEDICINE

## 2021-03-25 LAB — TTG IGA SER-ACNC: 1 U/ML

## 2021-03-29 ENCOUNTER — TELEPHONE (OUTPATIENT)
Dept: FAMILY MEDICINE | Facility: CLINIC | Age: 71
End: 2021-03-29

## 2021-03-29 LAB — CRYOGLOB SER QL: NEGATIVE %

## 2021-04-14 PROBLEM — Z83.3 FAMILY HISTORY OF DIABETES MELLITUS IN FATHER: Status: ACTIVE | Noted: 2021-04-14

## 2021-04-14 PROBLEM — M79.674 PAIN IN TOES OF BOTH FEET: Status: ACTIVE | Noted: 2021-04-14

## 2021-04-14 PROBLEM — M79.675 PAIN IN TOES OF BOTH FEET: Status: ACTIVE | Noted: 2021-04-14

## 2021-04-14 PROBLEM — I75.023 PURPLE TOE SYNDROME OF BOTH FEET (H): Status: ACTIVE | Noted: 2021-04-14

## 2021-04-14 NOTE — PROGRESS NOTES
CHIEF COMPLAINT:  Medicare annual wellness visit, subsequent, with large focus on toe and foot pain.      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  Overall, he has done fairly well.  He had his first COVID vaccine on  and will be getting his second one on .  He is very happy about that.      The biggest concern is that he has uncomfortable, swollen and discolored toes.  He first brought this to my attention last March and early April and was worried about having chilblains, in the context of COVID infection.  He was never COVID positive.  He saw Dr. Gerard Jolley, a dietitian, at various times from May through September.      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  This problem ebbs and flows, but seems to keep coming back.  It is often worse after having dinner.  It seems like it might get worse when he has a glass of wine.  He has a sense of swelling and his feet can feel restless and almost hot.  He has had a couple different injections done.  He is concerned that we are missing something with this.      FAMILY HISTORY:  His father  at 63 from diabetic complications.  In addition, he had hypertension and his mother also has hypertension.  His father has gout.  Maternal grandmother had Alzheimer's, as did his mother and his sister.      ACTIVE MEDICAL PROBLEMS:  Reviewed.      CURRENT MEDICATIONS:  Reviewed.      HEALTHCARE MAINTENANCE:  He is nearsighted and has not had an exam since 2019.  There is a family history of macular degeneration.  He will follow up soon.  Dental care is up-to-date.  His hearing is slightly worse than it had been.  Blood pressure 139/86 and then 137/80 upon recheck.  BMI 23.21.  Weight today is 146 pounds and at home it is typically in the 139-142 range.  From an immunization standpoint, he has not had the shingles vaccine yet.  Labs were discussed and we will check a number of them today  as indicated below.  He is due for a colonoscopy in 2022.      MEDICARE QUESTIONS:  No problems at home with any activities of daily living.  No falls at home in the last year.  No symptoms of depression.  No memory loss.      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  Best blood pressure today was 137/80.  Pulse 99.  Temperature 97.  He is 5 feet 6.5 inches in height and weighs 146 pounds with a BMI of 23.21.  O2 sats are 99% on room air.   HEENT:  Head is normocephalic, atraumatic.  His canals are free of cerumen. The TMs look fine.  There is no pain with palpation of the frontal or maxillary sinuses.  Eyes are grossly normal.  He is wearing a mask.   NECK:  Supple without any anterior or posterior chain adenopathy.  No visible or palpable thyromegaly.  No carotid bruits are heard.   LUNGS:  Clear to auscultation bilaterally.   HEART:  Regular rate and rhythm without murmur.   EXTREMITIES:  Ankles are free of any edema.  His image of the toes reveals some purple spots distally.  He has a good dorsalis pedis pulse.  The tips of the toes are quite callused.  It appears that his feet are rubbing into the front of his shoes.  He does not have a high arch.      Labs today will consist of all the following:  Uric acid, CBC with differential, cryoglobulins, A1c, lipid panel, PSA 50 and protein electrophoresis.  This is based on previous findings.      ASSESSMENT/PLAN:   1.  Medicare annual wellness visit, subsequent, update with healthcare maintenance strategies.   2.  Screening for hyperlipidemia with a lipid panel.   3.  Screening for prostate cancer with a PSA 50.   4.  Pain in his feet and purple toes and concern of chilblains.  Multiple labs were obtained.  He will be notified of the results.  Follow up with Podiatry or Orthopedics or Rheumatology based on the findings.   5.  History of an elevated TSH.  We will check that again.   6.  Colonoscopy due in 2022.   7.  I would recommend the shingles vaccine  series at a pharmacy.   8.  Call any problems or questions.

## 2021-04-28 ENCOUNTER — OFFICE VISIT (OUTPATIENT)
Dept: OPHTHALMOLOGY | Facility: CLINIC | Age: 71
End: 2021-04-28
Attending: OPHTHALMOLOGY
Payer: COMMERCIAL

## 2021-04-28 DIAGNOSIS — H35.371 EPIRETINAL MEMBRANE (ERM) OF RIGHT EYE: ICD-10-CM

## 2021-04-28 DIAGNOSIS — H90.3 ASYMMETRICAL SENSORINEURAL HEARING LOSS: ICD-10-CM

## 2021-04-28 PROCEDURE — 92250 FUNDUS PHOTOGRAPHY W/I&R: CPT | Performed by: OPHTHALMOLOGY

## 2021-04-28 PROCEDURE — 92134 CPTRZ OPH DX IMG PST SGM RTA: CPT | Performed by: OPHTHALMOLOGY

## 2021-04-28 PROCEDURE — G0463 HOSPITAL OUTPT CLINIC VISIT: HCPCS

## 2021-04-28 PROCEDURE — 99207 FUNDUS PHOTOS OU (BOTH EYES): CPT | Mod: 26 | Performed by: OPHTHALMOLOGY

## 2021-04-28 PROCEDURE — 99213 OFFICE O/P EST LOW 20 MIN: CPT | Mod: GC | Performed by: OPHTHALMOLOGY

## 2021-04-28 ASSESSMENT — VISUAL ACUITY
CORRECTION_TYPE: GLASSES
OD_CC+: -1
METHOD: SNELLEN - LINEAR
OS_CC: 20/20
OD_CC: 20/20
OS_CC+: -2

## 2021-04-28 ASSESSMENT — EXTERNAL EXAM - LEFT EYE: OS_EXAM: NORMAL

## 2021-04-28 ASSESSMENT — CONF VISUAL FIELD
METHOD: COUNTING FINGERS
OS_NORMAL: 1
OD_NORMAL: 1

## 2021-04-28 ASSESSMENT — REFRACTION_WEARINGRX
SPECS_TYPE: SVL
OS_SPHERE: -2.25
OS_CYLINDER: SPHERE
OD_CYLINDER: +0.50
OD_AXIS: 100
OD_SPHERE: -1.50

## 2021-04-28 ASSESSMENT — TONOMETRY
IOP_METHOD: TONOPEN
OS_IOP_MMHG: 17
OD_IOP_MMHG: 15

## 2021-04-28 ASSESSMENT — SLIT LAMP EXAM - LIDS
COMMENTS: NORMAL
COMMENTS: NORMAL

## 2021-04-28 ASSESSMENT — CUP TO DISC RATIO
OS_RATIO: 0.4
OD_RATIO: 0.5

## 2021-04-28 ASSESSMENT — EXTERNAL EXAM - RIGHT EYE: OD_EXAM: NORMAL

## 2021-04-28 NOTE — PROGRESS NOTES
CC: follow up cataract and Age related macular degeneration     Interval history: Patient reports some glare from the left eye.      HPI: Maite Hale is a  70 year old year-old patient with history of pseudophakia left eye and mild ERM right eye who presents for flashing left ~10 days ago. Flashing lights have decreased in severity and frequency since then. Denies new floaters. Also notes a faint blurred spot at the center vision both eyes. Blurred spot is stable and not moving. Not sure how long he has had that.     He was seen here ~1 month ago for evaluation of possible AMD. Has 2 brothers (1 ). No metamorphopsia per pt, but noted occasional halo and radiating visual disturbances when he looks at lights left eye.    Past Ocular History:  - Eye or eyelid surgery:    - CE+IOL right eye  (Dr. Damian) - s/p YAG    - Chalazion   - Eye trauma: None  - DM or HTN: None   - FHx glaucoma or AMD: AMD   - Brother 1 - still living, wet AMD, receiving anti-vegf ([facility])   - Brother 2 - , dry AMD      Retinal Imaging:  OCT  21  RE: faint erm; 1 tr RPE elevation subfoveal; grossly normal macula; ?PVD  LE: faint erm; normal macula, no drusen     PHOTOS 21  consistent with exam     Assessment & Plan:  # Choroidal nevus right eye    - <1DD, no drusen   - seeing on the Optical Coherence Tomography    - Consider optos at next visit   - Monitor     # PVD left eye  Retina detachment precautions were discussed with the patient (presence or increased in flashes, floaters or a curtain in the visual field) and was asked to return if any of the those occur     # drusen on exam and  Optical Coherence Tomography   -  Family history of AMD   - Weekly Amsler Grid use was discussed and training performed.   -A diet of leafy green vegetables was encouraged.   - Return precautions were given.   - Monitor     # Tr ERM each eye    - Not visually significant     # superior temporal Chorioretinal  Scar left eye  -old      # Pseudophakia right eye    - Dr. Damian 2015   - S/p Yag Cap 2017   - Occasional haloes, but BCVA 20/20 (corrected for near)   - Monitor     # Nuclear senile cataract left eye  Visually significant:YES  Glare: Positive   Reports impacts ADL   Dilation:Good  Iris expansion: Not needed  Pseudoexfoliation: NO  Trypan Blue: No   Phacodonesis: No  Cornea guttae: rare  Anesthesia: peribulbar block    Surgical time: 30 min  Candidate for multifocal: NO  Candidate for toric IOL: NO  Anticoagulants: NO  Alpha blockers/Flomax: NO    Anesthesia: MAC    Trypan blue: yes     IFIS solution: No    Malyugin ring: No    Refractive target: -1.25    IOL type: 1 piece - ZCB00 20.5  Prior lens:    Plan for Cataract extraction intraocular lens left eye. Patient will call to schedule  I reviewed the indications, risks, benefits, and alternatives of the proposed surgical procedure. We discussed at length cataracts and the effect of the cataracts on vision.   We discussed the fact that modern cataract surgery is usually successful at alleviating symptoms of glare when the cataract is the causative factor. Other risks were discussed with patient including, but not limited to, failure to improve vision or further loss of vision,  and need for additional surgery, bleeding, infection, loss of vision and the remote possibility of complications of anesthesia. 1:1000 risk of infection/bleed/loss of eye; 1:100 risk of RD and need for further surgery. Patient agreed to proceed with surgery.  I provided multiple opportunities for the questions, answered all questions to the best of my ability, and confirmed that my answers and my discussion were understood.       Val Manriquez MD  Ophthalmology PGY-3      Follow up in 12 months for repeat DFE with optos pic and Optical Coherence Tomography both eyes   Retina detachment precautions were discussed with the patient (presence or increased in flashes, floaters or a curtain in the visual field) and was  asked to return if any of the those occur          ~~~~~~~~~~~~~~~~~~~~~~~~~~~~~~~~~~   Complete documentation of historical and exam elements from today's encounter can be found in the full encounter summary report (not reduplicated in this progress note).  I personally obtained the chief complaint(s) and history of present illness.  I confirmed and edited as necessary the review of systems, past medical/surgical history, family history, social history, and examination findings as documented by others; and I examined the patient myself.  I personally reviewed the relevant tests, images, and reports as documented above.  I personally reviewed the ophthalmic test(s) associated with this encounter, agree with the interpretation(s) as documented by the resident/fellow, and have edited the corresponding report(s) as necessary.   I formulated and edited as necessary the assessment and plan and discussed the findings and management plan with the patient and family    Silvia Shaikh MD   of Ophthalmology.  Retina Service   Department of Ophthalmology and Visual Neurosciences   [facility]  Phone: [phone]   Fax: [phone]

## 2021-04-28 NOTE — NURSING NOTE
Chief Complaints and History of Present Illnesses   Patient presents with     Posterior Vitreous Detachment Follow Up     1.5 year follow up     Chief Complaint(s) and History of Present Illness(es)     Posterior Vitreous Detachment Follow Up     Comments: 1.5 year follow up              Comments     Pt states vision has been good since last visit. Pt states vision in LE appearing more blurry.  Pt having more difficulty with glare around lights with LE.  No eye pain today. No flashes or floaters.    AMERICA Carreon April 28, 2021 7:38 AM

## 2021-07-12 ENCOUNTER — MYC MEDICAL ADVICE (OUTPATIENT)
Dept: FAMILY MEDICINE | Facility: CLINIC | Age: 71
End: 2021-07-12

## 2021-07-15 ENCOUNTER — NURSE TRIAGE (OUTPATIENT)
Dept: FAMILY MEDICINE | Facility: CLINIC | Age: 71
End: 2021-07-15

## 2021-07-15 ENCOUNTER — CARE COORDINATION (OUTPATIENT)
Dept: FAMILY MEDICINE | Facility: CLINIC | Age: 71
End: 2021-07-15

## 2021-07-15 NOTE — TELEPHONE ENCOUNTER
Michael Chopra,  This patient is interested in taking a medication by the name of Teresa and Dr. Rangel suggested he speak with you about potential resources.  Thanks,  Debbie, RNsw

## 2021-07-22 ENCOUNTER — VIRTUAL VISIT (OUTPATIENT)
Dept: FAMILY MEDICINE | Facility: CLINIC | Age: 71
End: 2021-07-22
Payer: COMMERCIAL

## 2021-07-22 DIAGNOSIS — B35.1 ONYCHOMYCOSIS: Primary | ICD-10-CM

## 2021-07-22 RX ORDER — ASPIRIN 81 MG/1
81 TABLET, CHEWABLE ORAL DAILY
COMMUNITY
Start: 2021-07-22 | End: 2022-05-02

## 2021-07-22 RX ORDER — MULTIVIT WITH MINERALS/LUTEIN
TABLET ORAL
COMMUNITY
Start: 2021-07-22 | End: 2022-05-02

## 2021-07-22 RX ORDER — EAR PLUGS
EACH OTIC (EAR)
COMMUNITY
Start: 2021-07-22 | End: 2022-05-02

## 2021-07-22 RX ORDER — TAVABOROLE TOPICAL SOLUTION, 5% 43.5 MG/ML
SOLUTION TOPICAL DAILY
Qty: 4 ML | Refills: 1 | Status: SHIPPED | OUTPATIENT
Start: 2021-07-22 | End: 2022-05-02

## 2021-07-22 RX ORDER — RIBOFLAVIN (VITAMIN B2) 100 MG
TABLET ORAL
COMMUNITY
Start: 2021-07-22 | End: 2022-05-02

## 2021-07-22 NOTE — PROGRESS NOTES
ASSESSMENT:    1. Condition - Onychomycosis: Adherence - Cost: Cannot afford medication product   savings card is available for Jublia, however is not eligible for patients in [place]. Alternative antifungal options are available that cover multiple fungi similar to Jublia. Tavaborole (Keydrin) has the same antifungal coverage as Jublia as a topical solution and available as a generic.     2. Condition - Supplements: Indication - Unnecessary medication: No medical indication  Sporadic use of supplements is likely not providing much benefit to the patient. Ongoing conversation regarding role in health and support of health goals would be beneficial.     3. Condition - ASCVD: No drug therapy problem  ASCVD 16.8% indicates intermediate risk per ACC/AHA cholesterol guidelines which supports statin use if other risk factors for CVD are present. Patient has no presence of significant risk factors including metabolic syndrome, family history, CKD or other inflammatory diseases. Indication of daily aspirin therapy is recommended for individuals up to 79 years of age for primary prevention per AHA/ACC 2019 aspirin guidelines. Ongoing conversation regarding benefits of consistent, daily aspirin use would be beneficial.     PLAN:  Medications comments/ concerns are:   1. Start tavaborole 5% solution once to affected area once daily for up to 48 weeks. -completed   2. Consider further conversation regarding use and effectiveness of supplements. -pending   3. Consider recommendation for daily aspirin 81 mg use. -pending     Follow up: as needed for effectiveness of antifungal or addition medication questions or barriers      - - - - - - - - - - - - - - -  SUBJECTIVE:  Maite is a 70 year old male called for an initial medication therapy management visit. Primary care provider is Bandar Rangel. Was referred by his provider for   Chief Complaint   Patient presents with     Medication Therapy Management   . Maite  brought medications to the visit: yes.     Main concern today is medication cost.    Allergies/ADRs: Reviewed in chart  Pt reports using the following medical devices: none  Pt reports the following barriers to care: none  Adverse reactions to medications: none  Concerns with medications: yes, see below    Tobacco: He reports that he has never smoked. He has never used smokeless tobacco.  Alcohol: unknown  Caffeine: no caffeine  Illicit drug use:no   Activity: regularly active with exercise     Medication Experience: comfortable with medications when they are needed  Reports of cognitive concerns: no     Onychomycosis - Long history of toenail fungus. Previously seeing dermatology at [facility] ~2 years ago - recommended Jublia which patient was able to use pharmacy discounts at a pharmacy in [place]. Reports effectiveness with clearing the fungal infection and has not had concerns since then. Had previously tried OTC fungal products which were ineffective.   Noticed slight change in toenails about 5-6 months ago which seems to the be fungus returning. Desires to get refill of Jublia to treat the infection early and keep it from spreading. Cost of Jublia is high and looking for possible cost savings or alternative options.       Not taking other prescription medications.   Taking other supplements/vitamins occasionally to supplement exercise routine and limited red meat intake. Pt is willing to send complete list of supplements via [employer].       OBJECTIVE:   Patient Care Team:  Bandar Rangel MD as PCP - General (Family Practice)  Brittney Mckeon MD as MD (Ophthalmology)  Corinna Christianson MD as MD (Dermatology)  Silvia Shaikh MD as MD (Ophthalmology)  Bandar Rangel MD as MD (Family Practice)  Dariela Marino MD as MD (Otolaryngology)  Silvia Shaikh MD as Assigned Surgical Provider  Gerard Jolley DPM as Assigned Musculoskeletal Provider  Bandar Rangel  "MD Isaac as Assigned PCP  Current Outpatient Medications   Medication Sig Dispense Refill     aspirin (ASA) 81 MG chewable tablet Take 81 mg by mouth daily A couple times per month       melatonin 1 MG TABS tablet Take 1 mg by mouth nightly as needed for sleep       Multiple Vitamins-Minerals (MULTI COMPLETE PO) Take 2 capsules by mouth once a week        Omega-3 Fatty Acids (FISH OIL BURP-LESS PO)        Probiotic Product (SOLUBLE FIBER/PROBIOTICS PO) Take 1 capful by mouth daily       VITAMIN D, CHOLECALCIFEROL, PO Take 2,000 Units by mouth daily         Allergies   Allergen Reactions     Seasonal Allergies      Sulfa Drugs      Facial swelling      Past Medical History:   Diagnosis Date     Ascending aorta dilatation (H) 2015    3.8     Hayfever      Lip edema 1995    ? minoxodil        There were no vitals filed for this visit.  Estimated body mass index is 23.21 kg/m  as calculated from the following:    Height as of 3/16/21: 1.689 m (5' 6.5\").    Weight as of 3/16/21: 66.2 kg (146 lb).    Last Comprehensive Metabolic Panel:  Sodium   Date Value Ref Range Status   10/16/2017 138 133 - 144 mmol/L Final     Potassium   Date Value Ref Range Status   10/16/2017 3.9 3.4 - 5.3 mmol/L Final     Chloride   Date Value Ref Range Status   10/16/2017 104 94 - 109 mmol/L Final     Carbon Dioxide   Date Value Ref Range Status   10/16/2017 29 20 - 32 mmol/L Final     Anion Gap   Date Value Ref Range Status   10/16/2017 5 3 - 14 mmol/L Final     Glucose   Date Value Ref Range Status   10/16/2017 88 70 - 99 mg/dL Final     Glucose Fasting   Date Value Ref Range Status   04/08/2019 98.0 51.0 - 109.0 mg/dL Final     Urea Nitrogen   Date Value Ref Range Status   10/16/2017 14 7 - 30 mg/dL Final     Creatinine   Date Value Ref Range Status   10/16/2017 0.98 0.66 - 1.25 mg/dL Final     GFR Estimate   Date Value Ref Range Status   10/16/2017 77 >60 mL/min/1.7m2 Final     Comment:     Non  GFR Calc     Calcium   Date " Value Ref Range Status   10/16/2017 9.0 8.5 - 10.1 mg/dL Final      BP Readings from Last 3 Encounters:   03/16/21 137/80   05/05/20 137/83   01/15/20 (!) 158/91       Cholesterol   Date Value Ref Range Status   03/24/2021 166 <200 mg/dL Final   03/16/2021 178.0 0.0 - 200.0 Final     HDL Cholesterol   Date Value Ref Range Status   03/24/2021 52 >39 mg/dL Final   03/16/2021 45.0 >40.0 Final     LDL Cholesterol Calculated   Date Value Ref Range Status   03/24/2021 99 <100 mg/dL Final     Comment:     Desirable:       <100 mg/dl   10/16/2017 117 (H) <100 mg/dL Final     Comment:     Above desirable:  100-129 mg/dl  Borderline High:  130-159 mg/dL  High:             160-189 mg/dL  Very high:       >189 mg/dl       LDL Cholesterol Direct   Date Value Ref Range Status   03/16/2021 85.0 0.0 - 129.0 Final   04/08/2019 116.0 0.0 - 129.0 Final     Triglycerides   Date Value Ref Range Status   03/24/2021 77 <150 mg/dL Final   03/16/2021 246.0 (H) 0.0 - 150.0 Final     Cholesterol/HDL Ratio   Date Value Ref Range Status   03/16/2021 4.0 0.0 - 5.0 Final   04/08/2019 3.7 0.0 - 5.0 Final        The 10-year ASCVD risk score (Parrishtamanna PARKINSON Jr., et al., 2013) is: 16.8%    Values used to calculate the score:      Age: 70 years      Sex: Male      Is Non- : No      Diabetic: No      Tobacco smoker: No      Systolic Blood Pressure: 137 mmHg      Is BP treated: No      HDL Cholesterol: 52 mg/dL      Total Cholesterol: 166 mg/dL     CrCl is around 67 mL/min calculated using Cockcroft-Gault and Actual body weight.       - - - - - - - - - - - - - - -  Options for treatment and/or follow-up care were reviewed with the patient. Maite was engaged and actively involved in the decision making process. Maite verbalized understanding of the options discussed and was satisfied with the final plan. Maite was given a copy of these recommendations and an up to date medication list in an after visit summary. This report was discussed  "with Bandar Rangel.      Keysha Reyes, PharmD, BCACP  Medication Management (MTM) Pharmacist  [facility]      - - - - - - - - - - - - - - -  Flowsheet completed.  # of medical conditions reviewed: 3  # of medications reviewed: 7  # of DTP identified: 2  Time spent: 30 minutes  Level of service:3      TELEPHONE VISIT DETAILS and Consent  Can you please confirm what state you are currently located in? [place]  \"If you are located in a state other than [place] we cannot proceed with your visit.  This is due to state licensing laws that do not allow your provider to practice in another state.  This is not a billing or insurance issue. Please let me know if you need prescriptions filled or have other immediate concerns and I will get that message to your care team. I can also have you speak to our  to make an appointment when you are back in the [place].      Maite Hale is a 70 year old male who is being evaluated via a billable telephone visit.      The patient has been notified of following:     \"This telephone visit will be conducted via a call between you and your physician/provider. We have found that certain health care needs can be provided without the need for a physical exam.  This service lets us provide the care you need with a short phone conversation.  If a prescription is necessary we can send it directly to your pharmacy.  If lab work is needed we can place an order for that and you can then stop by our lab to have the test done at a later time.    Telephone visits are billed at different rates depending on your insurance coverage. During this emergency period, for some insurers they may be billed the same as an in-person visit.  Please reach out to your insurance provider with any questions.    If during the course of the call the physician/provider feels a telephone visit is not appropriate, you will not be charged for this service.\"    Patient has given verbal consent " for Telephone visit?  Yes    How would you like to obtain your AVS? Tammi      Distant site (telephone provider location): [facility]  Appointment start time: 1:08 PM  Appointment end time: 1:29 PM  Phone call duration:  21 minutes

## 2021-09-05 ENCOUNTER — HEALTH MAINTENANCE LETTER (OUTPATIENT)
Age: 71
End: 2021-09-05

## 2021-12-01 ENCOUNTER — OFFICE VISIT (OUTPATIENT)
Dept: FAMILY MEDICINE | Facility: CLINIC | Age: 71
End: 2021-12-01
Payer: COMMERCIAL

## 2021-12-01 VITALS
BODY MASS INDEX: 23.41 KG/M2 | RESPIRATION RATE: 15 BRPM | HEART RATE: 85 BPM | SYSTOLIC BLOOD PRESSURE: 135 MMHG | WEIGHT: 147.25 LBS | DIASTOLIC BLOOD PRESSURE: 82 MMHG | TEMPERATURE: 97 F | OXYGEN SATURATION: 99 %

## 2021-12-01 DIAGNOSIS — Z23 NEEDS FLU SHOT: Primary | ICD-10-CM

## 2021-12-01 DIAGNOSIS — H61.23 BILATERAL IMPACTED CERUMEN: ICD-10-CM

## 2021-12-01 DIAGNOSIS — H91.90 DECREASED HEARING, UNSPECIFIED LATERALITY: ICD-10-CM

## 2021-12-01 NOTE — PROGRESS NOTES
SUBJECTIVE:   Maite Hale is a 71 year old male who presents to clinic today to discuss the following problem(s).    Decreased hearing  - has noticed over the recent past    - family reports he is losing his hearing    - watching TV can be harder to hear everything said    - at a recent party over [place] was having a hard time with multiple voices in one room  - is scheduled to meet with audiology for testing in the near future  - insurance will pay for ear cleaning at primary care clinic but not at audiologists  - denies ear pain, drainage, dizziness, fever, sore throat, headache     ROS: 10 point ROS neg other than the symptoms noted above in the HPI.      Today's PHQ-2:  PHQ-2 (  [employer]) 2021 3/16/2021   Q1: Little interest or pleasure in doing things 0 0   Q2: Feeling down, depressed or hopeless 0 0   PHQ-2 Score 0 0   PHQ-2 Total Score (12-17 Years)- Positive if 3 or more points; Administer PHQ-A if positive - 0   Q1: Little interest or pleasure in doing things - -   Q2: Feeling down, depressed or hopeless - -   PHQ-2 Score - -       Past Medical History:   Diagnosis Date     Ascending aorta dilatation (H)     3.8     Hayfever      Lip edema     ? minoxodil     Past Surgical History:   Procedure Laterality Date     CATARACT IOL, RT/LT Right 10/12/15     COLONOSCOPY  2012    repeat in      HAND SURGERY Right     fracture     HERNIA REPAIR Bilateral     mesh placed     Family History   Problem Relation Age of Onset     Neurologic Disorder Mother         Alzheimer, age 84     Neurologic Disorder Maternal Grandmother         Alzheimer     Diabetes Father         63      Hypertension Father         hx of elevated uric acid     Cerebrovascular Disease Father 63         of stroke     Macular Degeneration Brother      Macular Degeneration Brother      Eye Disorder Brother         Macular Degeneration     Neurologic Disorder Sister         Alzheimer, age 74     Cardiovascular  Brother         pacemaker     Hypothyroidism Sister      Cancer - colorectal No family hx of      Prostate Cancer No family hx of      Glaucoma No family hx of      Social History     Tobacco Use     Smoking status: Never Smoker     Smokeless tobacco: Never Used   Substance Use Topics     Alcohol use: Yes     Comment: occasionally     Drug use: No     Social History     Social History Narrative     Not on file       Current Outpatient Medications   Medication     Multiple Vitamins-Minerals (MULTI COMPLETE PO)     aspirin (ASA) 81 MG chewable tablet     Cyanocobalamin (VITAMIN B-12) 1000 MCG/15ML LIQD     melatonin 1 MG TABS tablet     Tavaborole (KERYDIN) 5 % topical solution     Vitamin A Palmitate 3 MG (84053 UT) tablet     vitamin E (TOCOPHEROL) 1000 units (450 mg) capsule     No current facility-administered medications for this visit.     I have reviewed the patient's past medical, surgical, family, and social history.     OBJECTIVE:   /82 (BP Location: Right arm, Patient Position: Sitting, Cuff Size: Adult Regular)   Pulse 85   Temp 97  F (36.1  C) (Skin)   Resp 15   Wt 66.8 kg (147 lb 4 oz)   SpO2 99%   BMI 23.41 kg/m      Constitutional: well-appearing, appears stated age  Eyes: conjunctivae without erythema, sclera anicteric.   EARS:   - initial exam: external canals are non-erythematous with no swelling and evidence of bleeding or drainage. TMs completely obscured with cerumen bilaterally  - post cleaning: canals are clear, TMs pearlescent and non-bulging bilaterally with no evidence of drainage  Cardiac: regular rate and rhythm, normal S1/S2, no murmur/rubs/gallops  Respiratory: lungs clear to auscultation bilaterally, normal work of breathing, no wheezes/crackles  Skin: no rashes, lesions, or wounds  Psych: affect is full and appropriate, speech is fluent and non-pressured    ASSESSMENT AND PLAN:     Maite was seen today for ear problem.    Diagnoses and all orders for this visit:    Needs  flu shot  -     INFLUENZA, QUAD, HIGH DOSE, PF, 65YR + (FLUZONE HD)    Bilateral impacted cerumen    Decreased hearing, unspecified laterality          Morales Real MD  [facility]  12/01/2021, 10:16 AM

## 2021-12-01 NOTE — NURSING NOTE
71 year old  Chief Complaint   Patient presents with     Ear Problem     check ears for wax, prior to ear exam        Blood pressure 135/82, pulse 85, temperature 97  F (36.1  C), temperature source Skin, resp. rate 15, weight 66.8 kg (147 lb 4 oz), SpO2 99 %. Body mass index is 23.41 kg/m .  Patient Active Problem List   Diagnosis     Advanced directives, counseling/discussion     Need for prophylactic vaccination and inoculation against influenza-refuses     Hyperlipidemia with target LDL less than 130     Nonsmoker     Acute seasonal allergic rhinitis     History of angioedema     Elevated TSH     Asymmetrical sensorineural hearing loss     Onychomycosis     Lumbosacral radiculopathy     Family history of diabetes mellitus in father     Pain in toes of both feet     Purple toe syndrome of both feet (H)       Wt Readings from Last 2 Encounters:   12/01/21 66.8 kg (147 lb 4 oz)   03/16/21 66.2 kg (146 lb)     BP Readings from Last 3 Encounters:   12/01/21 135/82   03/16/21 137/80   05/05/20 137/83         Current Outpatient Medications   Medication     Multiple Vitamins-Minerals (MULTI COMPLETE PO)     aspirin (ASA) 81 MG chewable tablet     Cyanocobalamin (VITAMIN B-12) 1000 MCG/15ML LIQD     melatonin 1 MG TABS tablet     Tavaborole (KERYDIN) 5 % topical solution     Vitamin A Palmitate 3 MG (97435 UT) tablet     vitamin E (TOCOPHEROL) 1000 units (450 mg) capsule     No current facility-administered medications for this visit.       Social History     Tobacco Use     Smoking status: Never Smoker     Smokeless tobacco: Never Used   Substance Use Topics     Alcohol use: Yes     Comment: occasionally     Drug use: No       Health Maintenance Due   Topic Date Due     ZOSTER IMMUNIZATION (1 of 2) Never done     Pneumococcal Vaccine: 65+ Years (1 of 1 - PPSV23) Never done     ADVANCE CARE PLANNING  10/26/2017     DTAP/TDAP/TD IMMUNIZATION (2 - Td or Tdap) 05/04/2019     INFLUENZA VACCINE (1) 09/01/2021       No results  found for: PAP      December 1, 2021 10:12 AM

## 2021-12-06 ENCOUNTER — TRANSFERRED RECORDS (OUTPATIENT)
Dept: HEALTH INFORMATION MANAGEMENT | Facility: CLINIC | Age: 71
End: 2021-12-06
Payer: COMMERCIAL

## 2021-12-07 ENCOUNTER — TELEPHONE (OUTPATIENT)
Dept: FAMILY MEDICINE | Facility: CLINIC | Age: 71
End: 2021-12-07
Payer: COMMERCIAL

## 2021-12-07 NOTE — TELEPHONE ENCOUNTER
Who is calling? Patient   Reason for Call: Fax will be coming from [facility] and its time sensitive. Needs Dr. Rangel approval and its for an MRI. Hoping to get a call back when its been faxed back to [facility]

## 2021-12-08 NOTE — TELEPHONE ENCOUNTER
RN box - no [facility] orders have come through. Please call patient and let him know we have not received orders.  Jyotsna Troncoso MS RN-BC  12/08/21  2:15 PM

## 2021-12-17 ENCOUNTER — THERAPY VISIT (OUTPATIENT)
Dept: PHYSICAL THERAPY | Facility: CLINIC | Age: 71
End: 2021-12-17
Payer: COMMERCIAL

## 2021-12-17 DIAGNOSIS — M54.50 ACUTE LEFT-SIDED LOW BACK PAIN WITHOUT SCIATICA: ICD-10-CM

## 2021-12-17 PROCEDURE — 97112 NEUROMUSCULAR REEDUCATION: CPT | Mod: GP | Performed by: PHYSICAL THERAPIST

## 2021-12-17 PROCEDURE — 97161 PT EVAL LOW COMPLEX 20 MIN: CPT | Mod: GP | Performed by: PHYSICAL THERAPIST

## 2021-12-17 PROCEDURE — 97110 THERAPEUTIC EXERCISES: CPT | Mod: GP | Performed by: PHYSICAL THERAPIST

## 2021-12-17 NOTE — PROGRESS NOTES
[facility] Initial Evaluation -- Lumbar    Date: December 17, 2021  Maite Hale is a 71 year old male with a lumbar condition.   Referral: SELF  Work mechanical stresses:  Sitting, computer use  Employment status:   at [employer]  Leisure mechanical stresses: normal household activities  Functional disability score (GUSTAVO/STarT Back):  8%; 0/9--low  VAS score (0-10): 0-1/10  Patient goals/expectations:  To not have the pain    HISTORY:    Present symptoms: B LBP  Pain quality (sharp/shooting/stabbing/aching/burning/cramping):  Aching, sharp, stiff   Paresthesia (yes/no):  no    Present since (onset date): 12/03/2021.     Symptoms (improving/unchanging/worsening):  improving.     Symptoms commenced as a result of: unknown--maybe sleeping on stomach, maybe doing abdominal strengthening exercises  Condition occurred in the following environment:   unknown     Symptoms at onset (back/thigh/leg): B LBP  Constant symptoms (back/thigh/leg): none  Intermittent symptoms (back/thigh/leg): LBP    Symptoms are made worse with the following: getting up in the morning--up to 3/10 pain; sitting 2-3 hours   Symptoms are made better with the following: movement    Disturbed sleep (yes/no):  no Sleeping postures (prone/sup/side R/L): sides    Previous episodes (0/1-5/6-10/11+): a few over the years Year of first episode: ?    Previous history: a few episodes of LBP over the past several years  Previous treatments: PT in past, chiro for this episode--min benefit      Specific Questions:  Cough/Sneeze/Strain (pos/neg): neg  Bowel/Bladder (normal/abnormal): nomal  Gait (normal/abnormal): normal  Medications (nil/NSAIDS/analg/steroids/anticoag/other):  None  Medical allergies:  None  General health (excellent/good/fair/poor):  excellent  Pertinent medical history:  None  Imaging (None/Xray/MRI/Other):  X-ray--mild L4-5 degeneration  Recent or major surgery (yes/no):  none  Night pain (yes/no):  no  Accidents (yes/no): no  Unexplained weight loss (yes/no): no  Barriers at home: no  Other red flags: no    EXAMINATION    Posture:   Sitting (good/fair/poor): poor  Standing (good/fair/poor):fair  Lordosis (red/acc/normal): red  Correction of posture (better/worse/no effect): NE--did not have pain to start; standard roll more comfortable than firm    Lateral Shift (right/left/nil): nil  Relevant (yes/no):  na  Other Observations: na    Neurological:    Motor deficit:  Not assessed--no radicular complaints    Reflexes:  Not assessed--no radicular complaints    Sensory deficit:  Not assessed--no radicular complaints    Dural signs:  Not assessed--no radicular complaints      Movement Loss:   Mick Mod Min Nil Pain   Flexion    x P. L LBP, PDM, ERP   Extension x    P. L LBP--PDM, ERP   Side Gliding R   x  P. L LBP--PDM, ERP   Side Gliding L   x  NE     Test Movements:   During: produces, abolishes, increases, decreases, no effect, centralizing, peripheralizing   After: better, worse, no better, no worse, no effect, centralized, peripheralized    Pretest symptoms standing:    Symptoms During Symptoms After ROM increased ROM decreased No Effect   FIS        Rep FIS        EIS        Rep EIS          Pretest symptoms lying: L LBP 0.5/10   Symptoms During Symptoms After ROM increased ROM decreased No Effect   EDIL        Rep EDIL        EIL Increases    No Worse         Rep EIL Increases    No Worse    X--extension improved; decreased pain B SG       If required, pretest symptoms:    Symptoms During Symptoms After ROM increased ROM decreased No Effect   SGIS - R        Rep SGIS - R        SGIS - L        Rep SGIS - L          Static Tests:  Sitting slouched:     Sitting erect:    Standing slouched:   Standing erect:    Lying prone in extension:   Long sitting:      Other Tests: Lumbar ext mobs--increases L LBP, NW after, improved extension ROM, less pain B SG     Provisional Classification:  Inconclusive/Other -  Mechanically Inconclusive    Principle of Management:  Education:  Posture--use of lumbar roll in sitting, avoid slouch; POC, treatment rationale, expected response   Equipment provided:    Mechanical therapy (Y/N):  y   Extension principle:  REIL x10 reps, every 2 hours OR REIL x10 reps, every 2 hours  Lateral Principle:    Flexion principle:    Other:       ASSESSMENT/PLAN:    Patient is a 71 year old male with lumbar complaints.  He has significant loss of lumbar extension and should benefit from improving motion in this direction.  He had improved extension ROM and decreased pain with B SG after repeated lumbar extension exercises in lying and will try at home to assess.      Patient has the following significant findings with corresponding treatment plan.                Diagnosis 1:  L LBP  Pain -  self management, education, directional preference exercise and home program  Decreased ROM/flexibility - manual therapy, therapeutic exercise and home program  Decreased function - therapeutic activities and home program  Impaired posture - neuro re-education and home program    Cumulative Therapy Evaluation is: Low complexity.    Previous and current functional limitations:  (See Goal Flow Sheet for this information)    Short term and Long term goals: (See Goal Flow Sheet for this information)     Communication ability:  Patient appears to be able to clearly communicate and understand verbal and written communication and follow directions correctly.  Treatment Explanation - The following has been discussed with the patient:   RX ordered/plan of care  Anticipated outcomes  Possible risks and side effects  This patient would benefit from PT intervention to resume normal activities.   Rehab potential is good.    Frequency:  1 X week, once daily  Duration:  for 2 weeks tapering to 2 X a month over 1 month  Discharge Plan:  Achieve all LTG.  Independent in home treatment program.  Reach maximal therapeutic  benefit.    Please refer to the daily flowsheet for treatment today, total treatment time and time spent performing 1:1 timed codes.

## 2021-12-22 ENCOUNTER — THERAPY VISIT (OUTPATIENT)
Dept: PHYSICAL THERAPY | Facility: CLINIC | Age: 71
End: 2021-12-22
Payer: COMMERCIAL

## 2021-12-22 DIAGNOSIS — M54.50 ACUTE LEFT-SIDED LOW BACK PAIN WITHOUT SCIATICA: ICD-10-CM

## 2021-12-22 PROCEDURE — 97140 MANUAL THERAPY 1/> REGIONS: CPT | Mod: GP | Performed by: PHYSICAL THERAPIST

## 2021-12-22 PROCEDURE — 97110 THERAPEUTIC EXERCISES: CPT | Mod: GP | Performed by: PHYSICAL THERAPIST

## 2021-12-22 PROCEDURE — 97530 THERAPEUTIC ACTIVITIES: CPT | Mod: GP | Performed by: PHYSICAL THERAPIST

## 2022-01-21 ENCOUNTER — THERAPY VISIT (OUTPATIENT)
Dept: PHYSICAL THERAPY | Facility: CLINIC | Age: 72
End: 2022-01-21
Payer: COMMERCIAL

## 2022-01-21 DIAGNOSIS — M54.50 ACUTE LEFT-SIDED LOW BACK PAIN WITHOUT SCIATICA: ICD-10-CM

## 2022-01-21 PROCEDURE — 97110 THERAPEUTIC EXERCISES: CPT | Mod: GP | Performed by: PHYSICAL THERAPIST

## 2022-01-21 PROCEDURE — 2894A PR THERAPEUTIC ACTIVITIES, EA 15 MIN: CPT | Mod: GP | Performed by: PHYSICAL THERAPIST

## 2022-04-17 ENCOUNTER — HEALTH MAINTENANCE LETTER (OUTPATIENT)
Age: 72
End: 2022-04-17

## 2022-04-18 ENCOUNTER — PREP FOR PROCEDURE (OUTPATIENT)
Dept: OTOLARYNGOLOGY | Facility: CLINIC | Age: 72
End: 2022-04-18

## 2022-04-18 ENCOUNTER — OFFICE VISIT (OUTPATIENT)
Dept: OTOLARYNGOLOGY | Facility: CLINIC | Age: 72
End: 2022-04-18
Payer: COMMERCIAL

## 2022-04-18 VITALS
BODY MASS INDEX: 22.66 KG/M2 | WEIGHT: 141 LBS | HEIGHT: 66 IN | DIASTOLIC BLOOD PRESSURE: 88 MMHG | HEART RATE: 90 BPM | TEMPERATURE: 98 F | OXYGEN SATURATION: 100 % | SYSTOLIC BLOOD PRESSURE: 143 MMHG

## 2022-04-18 DIAGNOSIS — J34.2 DEVIATED NASAL SEPTUM: ICD-10-CM

## 2022-04-18 DIAGNOSIS — J34.2 DEVIATED NASAL SEPTUM: Primary | ICD-10-CM

## 2022-04-18 DIAGNOSIS — J34.89 NASAL OBSTRUCTION: Primary | ICD-10-CM

## 2022-04-18 PROCEDURE — 99212 OFFICE O/P EST SF 10 MIN: CPT | Performed by: OTOLARYNGOLOGY

## 2022-04-18 RX ORDER — DEXAMETHASONE SODIUM PHOSPHATE 4 MG/ML
10 INJECTION, SOLUTION INTRA-ARTICULAR; INTRALESIONAL; INTRAMUSCULAR; INTRAVENOUS; SOFT TISSUE ONCE
Status: CANCELLED | OUTPATIENT
Start: 2022-04-18 | End: 2022-04-18

## 2022-04-18 ASSESSMENT — PAIN SCALES - GENERAL: PAINLEVEL: NO PAIN (0)

## 2022-04-18 NOTE — NURSING NOTE
"Chief Complaint   Patient presents with     RECHECK     Follow up for \"blocked nostrils\"    Blood pressure (!) 143/88, pulse 90, temperature 98  F (36.7  C), temperature source Temporal, height 1.676 m (5' 6\"), weight 64 kg (141 lb), SpO2 100 %. Erika Contreras, EMT  "

## 2022-04-18 NOTE — PATIENT INSTRUCTIONS
1.  You were seen in the ENT Clinic today by . If you have any questions or concerns after your appointment, please call [phone]. Press option #1 for scheduling related needs. Press option #3 for Nurse advice.    2.    has recommended the following:   - surgery - 5/27/2022   - you will need a pre-op physical with your PCP - you are to schedule this - inform your doc at your physical that you are having surgery   - you will need a Covid-19 test within 4 days of surgery    3.  Plan is to return to clinic post operatively.      Jazmyne Ahn LPN  [phone]  [facility] - Otolaryngology

## 2022-04-18 NOTE — LETTER
2022       RE: Maite Hale  [address] [place] [address]     Dear Colleague,    Thank you for referring your patient, Maite Hale, to the [facility]SE AND [facility] [place] at . Please see a copy of my visit note below.    Otolaryngology Adult Consultation    Patient: Maite Hale   : 1950     Referring Provider: Bandar Rangel   Consulting Physician:  Dariela Marino MD       Patient seen previously with deviated septum and enlarged nasal turbinate. Recommended a trial of nasal steroids and to discuss surgery if that was not effective. Has not used the nasal steroids as he has used them in he past with limited results, and also has some nasal bleeding.     HPI: Maite Hale is a 69 year old male seen today in the Otolaryngology Clinic in consultation from Dr. Rangel for a history of an abnormal nasal exam.  Symptoms include worsening nasal obstruction over past several years, worse when laying down. Duration of symptoms has been years. Previous medical therapies tried and their effects include antihistamines and nasal steroids for allergy symptoms in  - some benefit. Previous surgery performed includes septoplasty age 21. Associated lower respiratory symptoms are none.      Previous imaging for ear issues shows nasal sinuses are clear, deviated septum and enlarged turbinate.      Nose  External nose is straight, skin is normal. Intranasal exam (anterior rhinoscopy) reveals normal moist mucosa, turbinate tissue without edema, erythema or crusting.  Septum deviated to left with compensatory hypertrophy of R inferior turbinate    A/P: Discussed medical and surgical option for treatment of nasal obstruction. Patient has not had full benefit from nasal steroids in the past. Discussed risks and benefits of nasal septoplasty and turbinate reduction and he would like to proceed. Will schedule for mid to late  May. Pre-op with Dr. Rangel, hopefully can do this at time of annual exam on May 2nd.       15 minutes spent on the date of the encounter doing chart review, history and exam, documentation and further activities per the note            Again, thank you for allowing me to participate in the care of your patient.      Sincerely,    Dariela Marino MD

## 2022-04-18 NOTE — PROGRESS NOTES
Otolaryngology Adult Consultation    Patient: Maite Hale   : 1950     Referring Provider: Bandar Rangel   Consulting Physician:  Dariela Marino MD       Patient seen previously with deviated septum and enlarged nasal turbinate. Recommended a trial of nasal steroids and to discuss surgery if that was not effective. Has not used the nasal steroids as he has used them in he past with limited results, and also has some nasal bleeding.     HPI: Maite Hale is a 69 year old male seen today in the Otolaryngology Clinic in consultation from Dr. Rangel for a history of an abnormal nasal exam.  Symptoms include worsening nasal obstruction over past several years, worse when laying down. Duration of symptoms has been years. Previous medical therapies tried and their effects include antihistamines and nasal steroids for allergy symptoms in  - some benefit. Previous surgery performed includes septoplasty age 21. Associated lower respiratory symptoms are none.      Previous imaging for ear issues shows nasal sinuses are clear, deviated septum and enlarged turbinate.      Nose  External nose is straight, skin is normal. Intranasal exam (anterior rhinoscopy) reveals normal moist mucosa, turbinate tissue without edema, erythema or crusting.  Septum deviated to left with compensatory hypertrophy of R inferior turbinate    A/P: Discussed medical and surgical option for treatment of nasal obstruction. Patient has not had full benefit from nasal steroids in the past. Discussed risks and benefits of nasal septoplasty and turbinate reduction and he would like to proceed. Will schedule for mid to late May. Pre-op with Dr. Rangel, hopefully can do this at time of annual exam on May 2nd.       15 minutes spent on the date of the encounter doing chart review, history and exam, documentation and further activities per the note

## 2022-04-19 ENCOUNTER — HOSPITAL ENCOUNTER (OUTPATIENT)
Facility: AMBULATORY SURGERY CENTER | Age: 72
End: 2022-04-19
Attending: OTOLARYNGOLOGY
Payer: COMMERCIAL

## 2022-04-19 DIAGNOSIS — J34.2 DEVIATED NASAL SEPTUM: ICD-10-CM

## 2022-05-02 ENCOUNTER — OFFICE VISIT (OUTPATIENT)
Dept: FAMILY MEDICINE | Facility: CLINIC | Age: 72
End: 2022-05-02
Payer: COMMERCIAL

## 2022-05-02 VITALS
SYSTOLIC BLOOD PRESSURE: 157 MMHG | TEMPERATURE: 97.3 F | BODY MASS INDEX: 23.63 KG/M2 | HEART RATE: 71 BPM | RESPIRATION RATE: 15 BRPM | OXYGEN SATURATION: 100 % | DIASTOLIC BLOOD PRESSURE: 90 MMHG | HEIGHT: 66 IN | WEIGHT: 147 LBS

## 2022-05-02 DIAGNOSIS — Z82.0 FAMILY HISTORY OF ALZHEIMER'S DISEASE: ICD-10-CM

## 2022-05-02 DIAGNOSIS — Z12.11 SCREENING FOR COLON CANCER: ICD-10-CM

## 2022-05-02 DIAGNOSIS — Z13.220 SCREENING FOR LIPID DISORDERS: ICD-10-CM

## 2022-05-02 DIAGNOSIS — H90.5 SENSORINEURAL HEARING LOSS (SNHL) OF LEFT EAR, UNSPECIFIED HEARING STATUS ON CONTRALATERAL SIDE: ICD-10-CM

## 2022-05-02 DIAGNOSIS — Z23 NEED FOR VACCINATION: ICD-10-CM

## 2022-05-02 DIAGNOSIS — Z83.518 FAMILY HISTORY OF MACULAR DEGENERATION: ICD-10-CM

## 2022-05-02 DIAGNOSIS — Z00.00 MEDICARE ANNUAL WELLNESS VISIT, SUBSEQUENT: Primary | ICD-10-CM

## 2022-05-02 DIAGNOSIS — Z12.5 SCREENING FOR PROSTATE CANCER: ICD-10-CM

## 2022-05-02 DIAGNOSIS — K59.01 SLOW TRANSIT CONSTIPATION: ICD-10-CM

## 2022-05-02 LAB
ANION GAP SERPL CALCULATED.3IONS-SCNC: 4 MMOL/L (ref 3–14)
BUN SERPL-MCNC: 16 MG/DL (ref 7–30)
CALCIUM SERPL-MCNC: 9.4 MG/DL (ref 8.5–10.1)
CHLORIDE BLD-SCNC: 106 MMOL/L (ref 94–109)
CHOLEST SERPL-MCNC: 184 MG/DL
CO2 SERPL-SCNC: 30 MMOL/L (ref 20–32)
CREAT SERPL-MCNC: 0.96 MG/DL (ref 0.66–1.25)
FASTING STATUS PATIENT QL REPORTED: YES
GFR SERPL CREATININE-BSD FRML MDRD: 85 ML/MIN/1.73M2
GLUCOSE BLD-MCNC: 95 MG/DL (ref 70–99)
HDLC SERPL-MCNC: 57 MG/DL
LDLC SERPL CALC-MCNC: 115 MG/DL
NONHDLC SERPL-MCNC: 127 MG/DL
POTASSIUM BLD-SCNC: 4 MMOL/L (ref 3.4–5.3)
PSA SERPL-MCNC: 1.24 UG/L (ref 0–4)
SODIUM SERPL-SCNC: 140 MMOL/L (ref 133–144)
TRIGL SERPL-MCNC: 61 MG/DL
TSH SERPL DL<=0.005 MIU/L-ACNC: 3.5 MU/L (ref 0.4–4)

## 2022-05-02 PROCEDURE — 80048 BASIC METABOLIC PNL TOTAL CA: CPT | Performed by: FAMILY MEDICINE

## 2022-05-02 PROCEDURE — 84443 ASSAY THYROID STIM HORMONE: CPT | Performed by: FAMILY MEDICINE

## 2022-05-02 PROCEDURE — 80061 LIPID PANEL: CPT | Performed by: FAMILY MEDICINE

## 2022-05-02 PROCEDURE — G0103 PSA SCREENING: HCPCS | Performed by: FAMILY MEDICINE

## 2022-05-02 NOTE — NURSING NOTE
"71 year old  Chief Complaint   Patient presents with     Physical     No other concerns        Blood pressure (!) 143/85, pulse 71, temperature 97.3  F (36.3  C), temperature source Skin, resp. rate 15, height 1.676 m (5' 6\"), weight 66.7 kg (147 lb), SpO2 100 %. Body mass index is 23.73 kg/m .  Patient Active Problem List   Diagnosis     Advanced directives, counseling/discussion     Need for prophylactic vaccination and inoculation against influenza-refuses     Hyperlipidemia with target LDL less than 130     Nonsmoker     Acute seasonal allergic rhinitis     History of angioedema     Elevated TSH     Asymmetrical sensorineural hearing loss     Onychomycosis     Lumbosacral radiculopathy     Family history of diabetes mellitus in father     Pain in toes of both feet     Purple toe syndrome of both feet (H)     Acute left-sided low back pain without sciatica     Deviated nasal septum       Wt Readings from Last 2 Encounters:   05/02/22 66.7 kg (147 lb)   04/18/22 64 kg (141 lb)     BP Readings from Last 3 Encounters:   05/02/22 (!) 143/85   04/18/22 (!) 143/88   12/01/21 135/82         Current Outpatient Medications   Medication     Multiple Vitamins-Minerals (MULTI COMPLETE PO)     UNABLE TO FIND     aspirin (ASA) 81 MG chewable tablet     Cyanocobalamin (VITAMIN B-12) 1000 MCG/15ML LIQD     melatonin 1 MG TABS tablet     Tavaborole (KERYDIN) 5 % topical solution     Vitamin A Palmitate 3 MG (08576 UT) tablet     vitamin E (TOCOPHEROL) 1000 units (450 mg) capsule     No current facility-administered medications for this visit.       Social History     Tobacco Use     Smoking status: Never Smoker     Smokeless tobacco: Never Used   Substance Use Topics     Alcohol use: Yes     Comment: 2 glasses of wine per week     Drug use: No       Health Maintenance Due   Topic Date Due     ZOSTER IMMUNIZATION (1 of 2) Never done     Pneumococcal Vaccine: 65+ Years (1 - PCV) Never done     ADVANCE CARE PLANNING  10/26/2017     " DTAP/TDAP/TD IMMUNIZATION (2 - Td or Tdap) 05/04/2019     MEDICARE ANNUAL WELLNESS VISIT  03/16/2022     FALL RISK ASSESSMENT  03/16/2022       No results found for: PAP      May 2, 2022 7:59 AM

## 2022-05-02 NOTE — PROGRESS NOTES
"CHIEF COMPLAINT: Medicare Annual Wellness Exam, Subsequent      HISTORY OF PRESENT ILLNESS:  Maite Hale is a 71 year old male who presents for an annual wellness visit.  Overall, he is doing well. He wears glasses and his eye care is not up to date. He is concerned about macular degeneration, as two of his brothers have had this. Maite has noticed some fasciculations in his eyelids which he thinks is from using his computer too much. He had an audiogram on 12/6/21 through [facility] and got hearing aids at that time, but elected to return them. His dental care is up to date. His BP is 143/85 today. At home, his SBP has been in the low to mid 130s. Body mass index is 23.73 kg/m . His home weights are generally around 141 pounds. He runs 3 miles every other day or so and also enjoys swimming. From an immunization standpoint, he is due for the Shingrix vaccine series, Tdap booster, and pneumococcal vaccine. He elects to received the pneumococcal vaccine, and I recommended that he get the Shingrix vaccines and Tdap booster at his pharmacy. Maite has received three doses of the Moderna COVID-19 vaccine, last dose administered on 11/2/21. Starting today, he is eligible for the fourth COVID-19 vaccine. Influenza vaccine received on 12/1/21.     He had a colonoscopy on 7/12/12 that found diverticulosis, due to repeat this year. I will order this for him today. Maite reports some baseline constipation. His TSH was slightly elevated when last checked, so we will recheck this again today. He is due for prostate cancer screening, PSA completed today.     Maite has seen Dr. Marino in ENT for a consultation for deviated septum replacement, which he has scheduled for 5/28/22. He feels that this bothers him \"a good amount\", and has noticed difficulty breathing when laying on his side at night or when the air is dry. He has been cautioned against this by her daughter.     MEDICARE PREVENTATIVE VISIT:  1. No problems with ADLs  2. No " "falls  3. No depression  4. Not concerned about memory loss at this point, but he does have a widespread family history of Alzheimer's. He is taking a lion's génesis mushroom supplement.      FAMILY, SOCIAL AND PAST SURGICAL HISTORIES: Updated      MEDICATIONS:   Carefully Reviewed      REVIEW OF SYSTEMS:  10-point review of systems negative unless otherwise stated in the HPI.       OBJECTIVE:    EXAM:    GENERAL: Maite is in no distress.  Affect is upbeat.   Alert and oriented x3  BP (!) 143/85 (BP Location: Left arm, Patient Position: Sitting, Cuff Size: Adult Regular)   Pulse 71   Temp 97.3  F (36.3  C) (Skin)   Resp 15   Ht 1.676 m (5' 6\")   Wt 66.7 kg (147 lb)   SpO2 100%   BMI 23.73 kg/m    HEENT:  Head is normocephalic, atraumatic. Ears clear bilaterally. No pain with palpation of the frontal or maxillary sinuses.  Eyes are grossly normal.  Nose and mouth masked.  Neck is supple without adenopathy or thyromegaly.  No carotid bruits are heard, no JVD.   BACK:  Smooth and straight.  No pain to percussion.  No CVA tenderness.   LUNGS:  Clear to auscultation bilaterally.   HEART:  Regular rate and rhythm without murmur.   EXTREMITIES:  Ankles free of any edema.   SKIN:  Warm and dry.       LABORATORY DATA:  No results found for any visits on 05/02/22.      ASSESSMENT AND PLAN:   1. Medicare Annual Wellness Exam, Subsequent: Up to date with healthcare maintenance strategies. Pneumococcal vaccine administered today. I recommended that he get the COVID-19 booster, Shingrix series, and Tdap booster at his pharmacy. BMP, lipid panel completed today, results pending. Maite is fasting.   2. Screening for colon cancer: Colonoscopy ordered today.   3. Screening for prostate cancer: PSA completed today, results pending.   4. Slow transit constipation: TSH with free T4 reflex completed today, results pending.  5. Family history of Alzheimer's disease: Maite has a widespread family history of Alzheimer's disease and is " taking lion's génesis mushroom supplements.  6. Family history of macular degeneration: I recommended that Maite schedule a dilated eye exam and be watched regularly due to his family history of macular degeneration in both brothers.   7. Sensorineural hearing loss (SNHL) of left ear, unspecified hearing status on contralateral side: Maite recently had an audiogram through [facility] but elected to return his hearing aids.   8. Follow up in 1 year or call if there are any questions or concerns.      I, Kay Pelayo, am serving as a scribe to document services personally performed by Dr. Bandar Rangel, based on data collection and the provider's statements to me. Dr. Rangel has reviewed, edited, and approved the above note.     I, Dr. Bandar Rangel, have interviewed and examined this patient, and I have thoroughly reviewed and edited this note and agree with its contents.

## 2022-05-02 NOTE — PATIENT INSTRUCTIONS
Consider getting your COVID booster anytime now (at [facility]).  You are due for a Tdap (tetanus) booster (at [facility]).  Consider getting your shingles 2-shot vaccine series (at [facility]).

## 2022-05-03 ENCOUNTER — TELEPHONE (OUTPATIENT)
Dept: GASTROENTEROLOGY | Facility: CLINIC | Age: 72
End: 2022-05-03
Payer: COMMERCIAL

## 2022-05-03 ENCOUNTER — HOSPITAL ENCOUNTER (OUTPATIENT)
Facility: CLINIC | Age: 72
End: 2022-05-03
Attending: INTERNAL MEDICINE | Admitting: INTERNAL MEDICINE
Payer: COMMERCIAL

## 2022-05-03 NOTE — TELEPHONE ENCOUNTER
Screening Questions  BlueKIND OF PREP RedLOCATION [review exclusion criteria] GreenSEDATION TYPE  1. Have you had a positive covid test in the last 90 days? N     2. Do you have a legal guardian or medical Power of ?  Are you able to give consent for your medical care?Y (Sedation review/consideration needed)    3. Are you active on mychart? Y    4. What insurance is in the chart? BCBS     3.   Ordering/Referring Provider: FELIBERTO    4. BMI 22.6 [BMI OVER 40-EXTENDED PREP]  If greater than 40 review exclusion criteria [PAC APPT IF @ [facility]]        5.  Respiratory Screening :  [If yes to any of the following HOSPITAL setting only]     Do you use daily home oxygen? N    Do you have mod to severe Obstructive Sleep Apnea? N  [OKAY @ [facility] SH PH RI]   Do you have Pulmonary Hypertension? N     Do you have UNCONTROLLED asthma? N        6.   Have you had a heart or lung transplant? N      7.   Are you currently on dialysis? N [ If yes, G-PREP & HOSPITAL setting only]     8.   Do you have chronic kidney disease? N [ If yes, G-PREP ]    9.   Have you had a stroke or Transient ischemic attack (TIA - aka  mini stroke ) within 6 months?  N (If yes, please review exclusion criteria)    10.   In the past 6 months, have you had any heart related issues including cardiomyopathy or heart attack? N           If yes, did it require cardiac stenting or other implantable device?       11.   Do you have any implantable devices in your body (pacemaker, defib, LVAD)? N (If yes, please review exclusion criteria)    12.   Do you take nitroglycerin? N           If yes, how often?   (if yes, HOSPITAL setting ONLY)    13.   Are you currently taking any blood thinners? N           [IF YES, INFORM PATIENT TO FOLLOW UP W/ ORDERING PROVIDER FOR BRIDGING INSTRUCTIONS]     14.   Do you have a diagnosis of diabetes? N   [ If yes, G-PREP ]    15.   [FEMALES] Are you currently pregnant?     If yes, how many weeks?     16.   Are you taking  any prescription pain medications on a routine schedule?  N  [ If yes, EXTENDED PREP.] [If yes, MAC]    17.   Do you have any chemical dependencies such as alcohol, street drugs, or methadone?  N [If yes, MAC]    18.   Do you have any history of post-traumatic stress syndrome, severe anxiety or history of psychosis?  N  [If yes, MAC]    19.   Do you transfer independently?  Y    20.  On a regular basis do you go 3-5 days between bowel movements? N   [ If yes, EXTENDED PREP.]    21.   Preferred LOCAL Pharmacy for Pre Prescription      [id number] - [place], [place] - [address]      Scheduling Details      Caller : Maite Hale    (Please ask for phone number if not scheduled by patient)    Type of Procedure Scheduled: COLON  Which Colonoscopy Prep was Sent?: SABIHA MANZANO CF PATIENTS & GROEN'S PATIENTS NEEDS EXTENDED PREP  Surgeon: CASSANDRA NAVARRO  Date of Procedure: 7/12/22  Location:       Sedation Type: CS  Conscious Sedation- Needs  for 6 hours after the procedure  MAC/General-Needs  for 24 hours after procedure    Pre-op Required at [facility] [place] [place] and OR for MAC sedation: N  (advise patient they will need a pre-op prior to procedure -)      Informed patient they will need an adult  Y  Cannot take any type of public or medical transportation alone    Pre-Procedure Covid test [facility] or [place] [place]    Confirmed Nurse will call to complete assessment Y    Additional comments:

## 2022-05-16 ENCOUNTER — VIRTUAL VISIT (OUTPATIENT)
Dept: OTOLARYNGOLOGY | Facility: CLINIC | Age: 72
End: 2022-05-16
Payer: COMMERCIAL

## 2022-05-16 VITALS — HEIGHT: 66 IN | WEIGHT: 142 LBS | BODY MASS INDEX: 22.82 KG/M2

## 2022-05-16 DIAGNOSIS — J34.2 DEVIATED NASAL SEPTUM: ICD-10-CM

## 2022-05-16 DIAGNOSIS — Z11.59 ENCOUNTER FOR SCREENING FOR OTHER VIRAL DISEASES: Primary | ICD-10-CM

## 2022-05-16 DIAGNOSIS — J34.89 NASAL OBSTRUCTION: Primary | ICD-10-CM

## 2022-05-16 PROCEDURE — 99212 OFFICE O/P EST SF 10 MIN: CPT | Mod: 95 | Performed by: OTOLARYNGOLOGY

## 2022-05-16 ASSESSMENT — PAIN SCALES - GENERAL: PAINLEVEL: NO PAIN (0)

## 2022-05-16 NOTE — LETTER
5/16/2022       RE: Maite Hale  [address]  [place] [address]     Dear Colleague,    Thank you for referring your patient, Maite Hale, to the [facility] EAR NOSE AND THROAT CLINIC [place] at [facility]. Please see a copy of my visit note below.    .Maite is a 71 year old who is being evaluated via a billable video visit.      How would you like to obtain your AVS? MyChart  If the video visit is dropped, the invitation should be resent by: Text to cell phone: .phone  Will anyone else be joining your video visit? No    Video Start Time: 12:45  Video-Visit Details    Type of service:  Video Visit    Video End Time:12:54    Originating Location (pt. Location): Home    Distant Location (provider location):  [facility] E[facility]SE [facility] THROAT CLINIC [place]     Platform used for Video Visit: My Visual Brief     The patient wanted to further discuss the risks and benefits of the upcoming procedure to treat his nasal obstruction. We reviewed the procedure in detail and I answered his specific questions. He may consider more regular use of nasal steroids and opt out of surgery. Although the surgery is low risk, he is contemplating the benefit and will let us know in the next few days whether he would like to proceed.    GENERAL: Healthy, alert and no distress  EYES: Eyes grossly normal to inspection.  No discharge or erythema, or obvious scleral/conjunctival abnormalities.  RESP: No audible wheeze, cough, or visible cyanosis.  No visible retractions or increased work of breathing.    SKIN: Visible skin clear. No significant rash, abnormal pigmentation or lesions.  NEURO: Cranial nerves grossly intact.  Mentation and speech appropriate for age.  PSYCH: Mentation appears normal, affect normal/bright, judgement and insight intact, normal speech and appearance well-groomed.    A/P: Will await his decision regarding elective surgery.       10 minutes spent on the  date of the encounter doing chart review, history and exam, documentation and further activities per the note      Again, thank you for allowing me to participate in the care of your patient.      Sincerely,    Dariela Marino MD

## 2022-05-16 NOTE — PROGRESS NOTES
Waqas is a 71 year old who is being evaluated via a billable video visit.      How would you like to obtain your AVS? MyChart  If the video visit is dropped, the invitation should be resent by: Text to cell phone: .phone  Will anyone else be joining your video visit? No    Video Start Time: 12:45  Video-Visit Details    Type of service:  Video Visit    Video End Time:12:54    Originating Location (pt. Location): Home    Distant Location (provider location):  [facility] EAR NOSE [facility] THROAT CLINIC [place]     Platform used for Video Visit: Snocap     The patient wanted to further discuss the risks and benefits of the upcoming procedure to treat his nasal obstruction. We reviewed the procedure in detail and I answered his specific questions. He may consider more regular use of nasal steroids and opt out of surgery. Although the surgery is low risk, he is contemplating the benefit and will let us know in the next few days whether he would like to proceed.    GENERAL: Healthy, alert and no distress  EYES: Eyes grossly normal to inspection.  No discharge or erythema, or obvious scleral/conjunctival abnormalities.  RESP: No audible wheeze, cough, or visible cyanosis.  No visible retractions or increased work of breathing.    SKIN: Visible skin clear. No significant rash, abnormal pigmentation or lesions.  NEURO: Cranial nerves grossly intact.  Mentation and speech appropriate for age.  PSYCH: Mentation appears normal, affect normal/bright, judgement and insight intact, normal speech and appearance well-groomed.    A/P: Will await his decision regarding elective surgery.       10 minutes spent on the date of the encounter doing chart review, history and exam, documentation and further activities per the note

## 2022-05-16 NOTE — PATIENT INSTRUCTIONS
If you have any questions before or after surgery please call:    JUDITH Farfan  [facility]  Department of Otolaryngology  [phone]

## 2022-05-23 ENCOUNTER — HOSPITAL ENCOUNTER (OUTPATIENT)
Facility: CLINIC | Age: 72
End: 2022-05-23
Attending: INTERNAL MEDICINE | Admitting: INTERNAL MEDICINE
Payer: COMMERCIAL

## 2022-05-23 ENCOUNTER — TELEPHONE (OUTPATIENT)
Dept: GASTROENTEROLOGY | Facility: CLINIC | Age: 72
End: 2022-05-23
Payer: COMMERCIAL

## 2022-05-23 NOTE — TELEPHONE ENCOUNTER
Caller:LOU     Procedure: COLON    Date, Location, and Surgeon of Procedure Cancelled: 7/12/2022, [facility], [facility]    Ordering Provider:FELIBERTO    Reason for cancel (please be detailed, any staff messages or encounters to note?): WIFE HAS SURGERY AT [place]         Rescheduled: YES     If rescheduled:    Date: 7/18/2022   Location: [facility]   Prep Resent: Y(changes to prep?)   Covid Test Rescheduled: Y   Note any change or update to original order/sedation: Y       PATIENT WANTED TO CHECK IF [facility] OR [facility] COULD DO IT, SENT MESSAGE TO KENNETH RUIZ

## 2022-06-29 ENCOUNTER — HOSPITAL ENCOUNTER (OUTPATIENT)
Facility: CLINIC | Age: 72
End: 2022-06-29
Attending: INTERNAL MEDICINE | Admitting: INTERNAL MEDICINE
Payer: COMMERCIAL

## 2022-06-29 ENCOUNTER — TELEPHONE (OUTPATIENT)
Dept: GASTROENTEROLOGY | Facility: CLINIC | Age: 72
End: 2022-06-29

## 2022-06-29 NOTE — TELEPHONE ENCOUNTER
Caller: Maite Hale      Procedure: Colon    Date, Location, and Surgeon of Procedure Cancelled: 7/18, Kranthi JOHN    Ordering Provider:MICHAEL DAO      Reason for cancel (please be detailed, any staff messages or encounters to note?): pt scheduling conflict        Rescheduled: y     If rescheduled:    Date: 7/19   Location:    Prep Resent: resent, no changes (changes to prep?)   Covid Test Rescheduled: home test   Note any change or update to original order/sedation: n/a

## 2022-06-29 NOTE — TELEPHONE ENCOUNTER
Patient calling, states has a colonoscopy on 7.19.22. Has numerous questions on what is considered low fiber diet and what exactly can he eat 7 days prior to the procedure.    All questions addressed. Advised will get a pre procedure phone call about a week prior to his procedure to go over the instructions in detail.     Patient verbalized understanding and in agreement with plan.

## 2022-07-05 ENCOUNTER — TELEPHONE (OUTPATIENT)
Dept: GASTROENTEROLOGY | Facility: CLINIC | Age: 72
End: 2022-07-05

## 2022-07-05 NOTE — TELEPHONE ENCOUNTER
Caller: Maite Hale      Procedure: Colon    Date, Location, and Surgeon of Procedure Cancelled: 7/12, [facility], [facility]    Ordering Provider:Bandar Rangel MD     Reason for cancel (please be detailed, any staff messages or encounters to note?): Needed to change the date         Rescheduled: Y     If rescheduled:    Date: 7/12   Location:    Prep Resent: Yes (changes to prep?)   Covid Test Rescheduled: No   Note any change or update to original order/sedation:

## 2022-07-06 NOTE — TELEPHONE ENCOUNTER
Pre assessment questions completed for upcoming colonoscopy  procedure scheduled on 7/12/22    COVID policy reviewed. Patient to complete rapid antigen test one to two days before their scheduled procedure. Patient to bring photo of the results when they come in for their procedure.    Reviewed procedural arrival time 1015 and facility location .    Designated  policy reviewed. Instructed to have someone stay 6 hours post procedure.     Procedure indication: screening    Bowel prep recommendation: Miralax/Magnesium citrate/Dulcolax     Reviewed Miralax prep instructions with patient. No fiber/iron supplements or foods that contain nuts/seeds 7 days prior to procedure.      Anticoagulation/blood thinners? no    Electronic implanted devices? no    Patient verbalized understanding and had no questions or concerns at this time.    Micaela Rodriguez RN

## 2022-07-12 ENCOUNTER — HOSPITAL ENCOUNTER (OUTPATIENT)
Facility: CLINIC | Age: 72
Discharge: HOME | End: 2022-07-12
Attending: INTERNAL MEDICINE | Admitting: INTERNAL MEDICINE
Payer: COMMERCIAL

## 2022-07-12 VITALS
WEIGHT: 138 LBS | DIASTOLIC BLOOD PRESSURE: 85 MMHG | OXYGEN SATURATION: 97 % | HEIGHT: 66 IN | RESPIRATION RATE: 33 BRPM | BODY MASS INDEX: 22.18 KG/M2 | HEART RATE: 63 BPM | SYSTOLIC BLOOD PRESSURE: 125 MMHG

## 2022-07-12 LAB — COLONOSCOPY: NORMAL

## 2022-07-12 PROCEDURE — 250N000011 HC RX IP 250 OP 636: Performed by: INTERNAL MEDICINE

## 2022-07-12 PROCEDURE — G0500 MOD SEDAT ENDO SERVICE >5YRS: HCPCS | Performed by: INTERNAL MEDICINE

## 2022-07-12 PROCEDURE — G0121 COLON CA SCRN NOT HI RSK IND: HCPCS | Performed by: INTERNAL MEDICINE

## 2022-07-12 PROCEDURE — 45378 DIAGNOSTIC COLONOSCOPY: CPT | Performed by: INTERNAL MEDICINE

## 2022-07-12 RX ORDER — LIDOCAINE 40 MG/G
CREAM TOPICAL
Status: CANCELLED | OUTPATIENT
Start: 2022-07-12

## 2022-07-12 RX ORDER — SODIUM CHLORIDE, SODIUM LACTATE, POTASSIUM CHLORIDE, CALCIUM CHLORIDE 600; 310; 30; 20 MG/100ML; MG/100ML; MG/100ML; MG/100ML
INJECTION, SOLUTION INTRAVENOUS CONTINUOUS
Status: CANCELLED | OUTPATIENT
Start: 2022-07-12

## 2022-07-12 RX ORDER — ONDANSETRON 2 MG/ML
4 INJECTION INTRAMUSCULAR; INTRAVENOUS
Status: CANCELLED | OUTPATIENT
Start: 2022-07-12

## 2022-07-12 RX ORDER — FENTANYL CITRATE 50 UG/ML
INJECTION, SOLUTION INTRAMUSCULAR; INTRAVENOUS PRN
Status: COMPLETED | OUTPATIENT
Start: 2022-07-12 | End: 2022-07-12

## 2022-07-12 RX ADMIN — MIDAZOLAM 2 MG: 1 INJECTION INTRAMUSCULAR; INTRAVENOUS at 10:57

## 2022-07-12 RX ADMIN — FENTANYL CITRATE 50 MCG: 50 INJECTION, SOLUTION INTRAMUSCULAR; INTRAVENOUS at 10:57

## 2022-07-12 NOTE — H&P
Maite Hale  [id number]  male  71 year old      Reason for procedure/surgery: screening colonoscopy      Patient Active Problem List   Diagnosis     Medicare annual wellness visit, subsequent     Need for prophylactic vaccination and inoculation against influenza-refuses     Hyperlipidemia with target LDL less than 130     Nonsmoker     Acute seasonal allergic rhinitis     History of angioedema     Elevated TSH     Asymmetrical sensorineural hearing loss     Onychomycosis     Lumbosacral radiculopathy     Family history of diabetes mellitus in father     Pain in toes of both feet     Purple toe syndrome of both feet (H)     Acute left-sided low back pain without sciatica     Deviated nasal septum     Family history of Alzheimer's disease     Family history of macular degeneration     Slow transit constipation     Sensorineural hearing loss (SNHL) of left ear, unspecified hearing status on contralateral side       Past Surgical History:    Past Surgical History:   Procedure Laterality Date     CATARACT IOL, RT/LT Right 10/12/15     COLONOSCOPY      repeat in      HAND SURGERY Right     fracture     HERNIA REPAIR Bilateral     mesh placed       Past Medical History:   Past Medical History:   Diagnosis Date     Ascending aorta dilatation (H)     3.8     Hayfever      Lip edema     ? minoxodil       Social History:   Social History     Tobacco Use     Smoking status: Never Smoker     Smokeless tobacco: Never Used   Substance Use Topics     Alcohol use: Yes     Comment: 2 glasses of wine per week       Family History:   Family History   Problem Relation Age of Onset     Neurologic Disorder Mother         Alzheimer, age 84     Neurologic Disorder Maternal Grandmother         Alzheimer     Diabetes Father         63      Hypertension Father         hx of elevated uric acid     Cerebrovascular Disease Father 63         of stroke     Macular Degeneration Brother      Macular Degeneration  "Brother      Eye Disorder Brother         Macular Degeneration     Neurologic Disorder Sister         Alzheimer, age 74     Cardiovascular Brother         pacemaker     Hypothyroidism Sister      Cancer - colorectal No family hx of      Prostate Cancer No family hx of      Glaucoma No family hx of        Allergies:   Allergies   Allergen Reactions     Seasonal Allergies      Sulfa Drugs      Facial swelling        Active Medications:   No current outpatient medications on file.       Systemic Review:   CONSTITUTIONAL: NEGATIVE for fever, chills, change in weight  ENT/MOUTH: NEGATIVE for ear, mouth and throat problems  RESP: NEGATIVE for significant cough or SOB  CV: NEGATIVE for chest pain, palpitations or peripheral edema    Physical Examination:   Vital Signs: BP (!) 136/90   Pulse 81   Resp 16   Ht 1.676 m (5' 6\")   Wt 62.6 kg (138 lb)   SpO2 98%   BMI 22.27 kg/m    GENERAL: healthy, alert and no distress  NECK: no adenopathy, no asymmetry, masses, or scars  RESP: lungs clear to auscultation - no rales, rhonchi or wheezes  CV: regular rate and rhythm, normal S1 S2, no S3 or S4, no murmur, click or rub, no peripheral edema and peripheral pulses strong  ABDOMEN: soft, nontender, no hepatosplenomegaly, no masses and bowel sounds normal  MS: no gross musculoskeletal defects noted, no edema    ASA: 2    Mallampati Score: 2    Plan: Appropriate to proceed as scheduled.      Jimmie Egan MD  7/12/2022    PCP:  Bandar Rangel    "

## 2022-07-18 ENCOUNTER — OFFICE VISIT (OUTPATIENT)
Dept: FAMILY MEDICINE | Facility: CLINIC | Age: 72
End: 2022-07-18
Payer: COMMERCIAL

## 2022-07-18 VITALS
HEART RATE: 83 BPM | TEMPERATURE: 98 F | WEIGHT: 140 LBS | DIASTOLIC BLOOD PRESSURE: 84 MMHG | RESPIRATION RATE: 15 BRPM | SYSTOLIC BLOOD PRESSURE: 139 MMHG | OXYGEN SATURATION: 99 % | BODY MASS INDEX: 22.6 KG/M2

## 2022-07-18 DIAGNOSIS — K13.70 ORAL LESION: Primary | ICD-10-CM

## 2022-07-18 NOTE — NURSING NOTE
71 year old  Chief Complaint   Patient presents with     Mouth/Lip Problem     Mouth infection on upper left gum       Blood pressure 139/84, pulse 83, temperature 98  F (36.7  C), temperature source Skin, resp. rate 15, weight 63.5 kg (140 lb), SpO2 99 %. Body mass index is 22.6 kg/m .  Patient Active Problem List   Diagnosis     Medicare annual wellness visit, subsequent     Need for prophylactic vaccination and inoculation against influenza-refuses     Hyperlipidemia with target LDL less than 130     Nonsmoker     Acute seasonal allergic rhinitis     History of angioedema     Elevated TSH     Asymmetrical sensorineural hearing loss     Onychomycosis     Lumbosacral radiculopathy     Family history of diabetes mellitus in father     Pain in toes of both feet     Purple toe syndrome of both feet (H)     Acute left-sided low back pain without sciatica     Deviated nasal septum     Family history of Alzheimer's disease     Family history of macular degeneration     Slow transit constipation     Sensorineural hearing loss (SNHL) of left ear, unspecified hearing status on contralateral side       Wt Readings from Last 2 Encounters:   07/18/22 63.5 kg (140 lb)   07/12/22 62.6 kg (138 lb)     BP Readings from Last 3 Encounters:   07/18/22 139/84   07/12/22 125/85   05/02/22 (!) 157/90         Current Outpatient Medications   Medication     Multiple Vitamins-Minerals (MULTI COMPLETE PO)     UNABLE TO FIND     No current facility-administered medications for this visit.       Social History     Tobacco Use     Smoking status: Never Smoker     Smokeless tobacco: Never Used   Substance Use Topics     Alcohol use: Yes     Comment: 2 glasses of wine per week     Drug use: No       Health Maintenance Due   Topic Date Due     ADVANCE CARE PLANNING  Never done     ZOSTER IMMUNIZATION (1 of 2) Never done     DTAP/TDAP/TD IMMUNIZATION (2 - Td or Tdap) 05/04/2019       No results found for: PAP      July 18, 2022 4:24 PM

## 2022-07-18 NOTE — PROGRESS NOTES
"  Assessment & Plan   Problem List Items Addressed This Visit    None     Visit Diagnoses     Oral lesion    -  Primary         Patient report of draining lesion of puss certainly argues for possible infection but at this point there is no evidence of infection. Nor would I expect infection to persist after drainage and regular mouth rinse. Encouraged Maite to leave the lesion alone tonight so that dentistry might have more to examine tomorrow. Could consider ABX treatment depending on dentistry findings and recommendations.     27 minutes spent on the date of the encounter doing chart review, history and exam, documentation and further activities as noted.    Morales Real MD  [facility]    Subjective   Maite is a 71 year old presenting for the following health issues:  Mouth/Lip Problem (Mouth infection on upper left gum)      HPI   Suspected oral infection  - for about a month has noticed a white bump on his upper anterior gum  - at one point it got large enough that he was able to \"pop\" it like a pimple and drain some puss  - thought it had resolved but then it came back  - as long as he keeps flushing his mouth with baking soda the lesion doesn't come back, but if he stops it comes right back  - most recently it came back two days ago  - started using baking soda and the lesion went away  - this morning there is almost nothing to see  - Maite reports there has never been any pain associated with it  - no fever, chills, nausea  - no tooth pain  - he does acknowledge he has been under a lot of stress with an upcoming move to [place] ([facility])  - he has an appointment with his dentist tomorrow to assess this    Review of Systems   Constitutional, HEENT, cardiovascular, pulmonary, gi and gu systems are negative, except as otherwise noted.      Objective    /84 (BP Location: Right arm, Patient Position: Sitting, Cuff Size: Adult Regular)   Pulse 83   Temp 98  F (36.7  C) (Skin)   Resp 15   Wt " 63.5 kg (140 lb)   SpO2 99%   BMI 22.60 kg/m    Body mass index is 22.6 kg/m .  Physical Exam   GENERAL: healthy, alert and no distress  HENT: minimal evidence of lesion in question on upper LEFT anterior gum line, no swelling, erythema, bleeding or drainage; ear canals and TM's normal  NECK: no adenopathy, no asymmetry, masses, or scars and thyroid normal to palpation  RESP: lungs clear to auscultation - no rales, rhonchi or wheezes  CV: regular rate and rhythm, normal S1 S2, no S3 or S4, no murmur, click or rub, no peripheral edema and peripheral pulses strong  ABDOMEN: soft, nontender, no hepatosplenomegaly, no masses and bowel sounds normal  MS: no gross musculoskeletal defects noted, no edema              .  ..

## 2022-09-16 ENCOUNTER — APPOINTMENT (OUTPATIENT)
Age: 72
Setting detail: DERMATOLOGY
End: 2022-09-16

## 2022-09-16 DIAGNOSIS — L30.4 ERYTHEMA INTERTRIGO: ICD-10-CM | Status: INADEQUATELY CONTROLLED

## 2022-09-16 PROCEDURE — ? PRESCRIPTION

## 2022-09-16 PROCEDURE — ? MEDICATION COUNSELING

## 2022-09-16 PROCEDURE — ? ORDER TESTS

## 2022-09-16 PROCEDURE — ? DIAGNOSIS COMMENT

## 2022-09-16 PROCEDURE — 99203 OFFICE O/P NEW LOW 30 MIN: CPT

## 2022-09-16 PROCEDURE — ? TREATMENT REGIMEN

## 2022-09-16 PROCEDURE — ? COUNSELING

## 2022-09-16 RX ORDER — HYDROCORTISONE 25 MG/G
CREAM TOPICAL
Qty: 30 | Refills: 1 | Status: ERX | COMMUNITY
Start: 2022-09-16

## 2022-09-16 RX ORDER — KETOCONAZOLE 20 MG/G
CREAM TOPICAL BID
Qty: 30 | Refills: 1 | Status: ERX | COMMUNITY
Start: 2022-09-16

## 2022-09-16 RX ADMIN — HYDROCORTISONE: 25 CREAM TOPICAL at 00:00

## 2022-09-16 RX ADMIN — KETOCONAZOLE: 20 CREAM TOPICAL at 00:00

## 2022-09-16 ASSESSMENT — LOCATION SIMPLE DESCRIPTION DERM: LOCATION SIMPLE: RIGHT LOWER EXTREMITY

## 2022-09-16 ASSESSMENT — LOCATION DETAILED DESCRIPTION DERM: LOCATION DETAILED: RIGHT MEDIAL THIGH

## 2022-09-16 ASSESSMENT — LOCATION ZONE DERM: LOCATION ZONE: LEG

## 2022-09-16 NOTE — HPI: RASH
What Type Of Note Output Would You Prefer (Optional)?: Standard Output
How Severe Is Your Rash?: moderate
Is This A New Presentation, Or A Follow-Up?: Rash
Additional History: Patient states he runs frequently. Patient has used hydrocortisone 3x total, 2x yesterday and 1x this morning. Patient reports that the rash has slightly improved since using hydrocortisone.

## 2022-09-16 NOTE — PROCEDURE: MEDICATION COUNSELING
Sarecycline Pregnancy And Lactation Text: This medication is Pregnancy Category D and not consider safe during pregnancy. It is also excreted in breast milk.
Elidel Pregnancy And Lactation Text: This medication is Pregnancy Category C. It is unknown if this medication is excreted in breast milk.
Cantharidin Counseling: Calcipotriene Counseling:  I discussed with the patient the risks of calcipotriene including but not limited to erythema, scaling, itching, and irritation.
Drysol Counseling:  I discussed with the patient the risks of drysol/aluminum chloride including but not limited to skin rash, itching, irritation, burning.
Rinvoq Pregnancy And Lactation Text: Based on animal studies, Rinvoq may cause embryo-fetal harm when administered to pregnant women.  The medication should not be used in pregnancy.  Breastfeeding is not recommended during treatment and for 6 days after the last dose.
Oxybutynin Counseling:  I discussed with the patient the risks of oxybutynin including but not limited to skin rash, drowsiness, dry mouth, difficulty urinating, and blurred vision.
Bexarotene Pregnancy And Lactation Text: This medication is Pregnancy Category X and should not be given to women who are pregnant or may become pregnant. This medication should not be used if you are breast feeding.
Doxycycline Pregnancy And Lactation Text: This medication is Pregnancy Category D and not consider safe during pregnancy. It is also excreted in breast milk but is considered safe for shorter treatment courses.
Valtrex Counseling: I discussed with the patient the risks of valacyclovir including but not limited to kidney damage, nausea, vomiting and severe allergy.  The patient understands that if the infection seems to be worsening or is not improving, they are to call.
Simponi Counseling:  I discussed with the patient the risks of golimumab including but not limited to myelosuppression, immunosuppression, autoimmune hepatitis, demyelinating diseases, lymphoma, and serious infections.  The patient understands that monitoring is required including a PPD at baseline and must alert us or the primary physician if symptoms of infection or other concerning signs are noted.
Nsaids Pregnancy And Lactation Text: These medications are considered safe up to 30 weeks gestation. It is excreted in breast milk.
Tranexamic Acid Pregnancy And Lactation Text: It is unknown if this medication is safe during pregnancy or breast feeding.
Siliq Pregnancy And Lactation Text: The risk during pregnancy and breastfeeding is uncertain with this medication.
Rinvoq Counseling: I discussed with the patient the risks of Rinvoq therapy including but not limited to upper respiratory tract infections, shingles, cold sores, bronchitis, nausea, cough, fever, acne, and headache. Live vaccines should be avoided.  This medication has been linked to serious infections; higher rate of mortality; malignancy and lymphoproliferative disorders; major adverse cardiovascular events; thrombosis; thrombocytopenia, anemia, and neutropenia; lipid elevations; liver enzyme elevations; and gastrointestinal perforations.
Mirvaso Counseling: Mirvaso is a topical medication which can decrease superficial blood flow where applied. Side effects are uncommon and include stinging, redness and allergic reactions.
Methotrexate Counseling:  Patient counseled regarding adverse effects of methotrexate including but not limited to nausea, vomiting, abnormalities in liver function tests. Patients may develop mouth sores, rash, diarrhea, and abnormalities in blood counts. The patient understands that monitoring is required including LFT's and blood counts.  There is a rare possibility of scarring of the liver and lung problems that can occur when taking methotrexate. Persistent nausea, loss of appetite, pale stools, dark urine, cough, and shortness of breath should be reported immediately. Patient advised to discontinue methotrexate treatment at least three months before attempting to become pregnant.  I discussed the need for folate supplements while taking methotrexate.  These supplements can decrease side effects during methotrexate treatment. The patient verbalized understanding of the proper use and possible adverse effects of methotrexate.  All of the patient's questions and concerns were addressed.
Doxepin Pregnancy And Lactation Text: This medication is Pregnancy Category C and it isn't known if it is safe during pregnancy. It is also excreted in breast milk and breast feeding isn't recommended.
Terbinafine Pregnancy And Lactation Text: This medication is Pregnancy Category B and is considered safe during pregnancy. It is also excreted in breast milk and breast feeding isn't recommended.
Doxycycline Counseling:  Patient counseled regarding possible photosensitivity and increased risk for sunburn.  Patient instructed to avoid sunlight, if possible.  When exposed to sunlight, patients should wear protective clothing, sunglasses, and sunscreen.  The patient was instructed to call the office immediately if the following severe adverse effects occur:  hearing changes, easy bruising/bleeding, severe headache, or vision changes.  The patient verbalized understanding of the proper use and possible adverse effects of doxycycline.  All of the patient's questions and concerns were addressed.
Carac Counseling:  I discussed with the patient the risks of Carac including but not limited to erythema, scaling, itching, weeping, crusting, and pain.
Acitretin Pregnancy And Lactation Text: This medication is Pregnancy Category X and should not be given to women who are pregnant or may become pregnant in the future. This medication is excreted in breast milk.
Erivedge Pregnancy And Lactation Text: This medication is Pregnancy Category X and is absolutely contraindicated during pregnancy. It is unknown if it is excreted in breast milk.
Winlevi Counseling:  I discussed with the patient the risks of topical clascoterone including but not limited to erythema, scaling, itching, and stinging. Patient voiced their understanding.
Colchicine Pregnancy And Lactation Text: This medication is Pregnancy Category C and isn't considered safe during pregnancy. It is excreted in breast milk.
Use Enhanced Medication Counseling?: No
Ivermectin Pregnancy And Lactation Text: This medication is Pregnancy Category C and it isn't known if it is safe during pregnancy. It is also excreted in breast milk.
Griseofulvin Pregnancy And Lactation Text: This medication is Pregnancy Category X and is known to cause serious birth defects. It is unknown if this medication is excreted in breast milk but breast feeding should be avoided.
Cyclophosphamide Counseling:  I discussed with the patient the risks of cyclophosphamide including but not limited to hair loss, hormonal abnormalities, decreased fertility, abdominal pain, diarrhea, nausea and vomiting, bone marrow suppression and infection. The patient understands that monitoring is required while taking this medication.
Cellcept Counseling:  I discussed with the patient the risks of mycophenolate mofetil including but not limited to infection/immunosuppression, GI upset, hypokalemia, hypercholesterolemia, bone marrow suppression, lymphoproliferative disorders, malignancy, GI ulceration/bleed/perforation, colitis, interstitial lung disease, kidney failure, progressive multifocal leukoencephalopathy, and birth defects.  The patient understands that monitoring is required including a baseline creatinine and regular CBC testing. In addition, patient must alert us immediately if symptoms of infection or other concerning signs are noted.
Libtayo Pregnancy And Lactation Text: This medication is contraindicated in pregnancy and when breast feeding.
Sski Pregnancy And Lactation Text: This medication is Pregnancy Category D and isn't considered safe during pregnancy. It is excreted in breast milk.
Dupixent Pregnancy And Lactation Text: This medication likely crosses the placenta but the risk for the fetus is uncertain. This medication is excreted in breast milk.
Topical Clindamycin Pregnancy And Lactation Text: This medication is Pregnancy Category B and is considered safe during pregnancy. It is unknown if it is excreted in breast milk.
Quinolones Pregnancy And Lactation Text: This medication is Pregnancy Category C and it isn't know if it is safe during pregnancy. It is also excreted in breast milk.
Erythromycin Pregnancy And Lactation Text: This medication is Pregnancy Category B and is considered safe during pregnancy. It is also excreted in breast milk.
Oral Minoxidil Pregnancy And Lactation Text: This medication should only be used when clearly needed if you are pregnant, attempting to become pregnant or breast feeding.
Enbrel Counseling:  I discussed with the patient the risks of etanercept including but not limited to myelosuppression, immunosuppression, autoimmune hepatitis, demyelinating diseases, lymphoma, and infections.  The patient understands that monitoring is required including a PPD at baseline and must alert us or the primary physician if symptoms of infection or other concerning signs are noted.
Erythromycin Counseling:  I discussed with the patient the risks of erythromycin including but not limited to GI upset, allergic reaction, drug rash, diarrhea, increase in liver enzymes, and yeast infections.
Taltz Counseling: I discussed with the patient the risks of ixekizumab including but not limited to immunosuppression, serious infections, worsening of inflammatory bowel disease and drug reactions.  The patient understands that monitoring is required including a PPD at baseline and must alert us or the primary physician if symptoms of infection or other concerning signs are noted.
Birth Control Pills Pregnancy And Lactation Text: This medication should be avoided if pregnant and for the first 30 days post-partum.
Rituxan Pregnancy And Lactation Text: This medication is Pregnancy Category C and it isn't know if it is safe during pregnancy. It is unknown if this medication is excreted in breast milk but similar antibodies are known to be excreted.
Calcipotriene Pregnancy And Lactation Text: This medication has not been proven safe during pregnancy. It is unknown if this medication is excreted in breast milk.
Minoxidil Counseling: Minoxidil is a topical medication which can increase blood flow where it is applied. It is uncertain how this medication increases hair growth. Side effects are uncommon and include stinging and allergic reactions.
Spironolactone Counseling: Patient advised regarding risks of diarrhea, abdominal pain, hyperkalemia, birth defects (for female patients), liver toxicity and renal toxicity. The patient may need blood work to monitor liver and kidney function and potassium levels while on therapy. The patient verbalized understanding of the proper use and possible adverse effects of spironolactone.  All of the patient's questions and concerns were addressed.
Adbry Pregnancy And Lactation Text: It is unknown if this medication will adversely affect pregnancy or breast feeding.
Azithromycin Counseling:  I discussed with the patient the risks of azithromycin including but not limited to GI upset, allergic reaction, drug rash, diarrhea, and yeast infections.
Tazorac Pregnancy And Lactation Text: This medication is not safe during pregnancy. It is unknown if this medication is excreted in breast milk.
Hydroxychloroquine Pregnancy And Lactation Text: This medication has been shown to cause fetal harm but it isn't assigned a Pregnancy Risk Category. There are small amounts excreted in breast milk.
Protopic Counseling: Patient may experience a mild burning sensation during topical application. Protopic is not approved in children less than 2 years of age. There have been case reports of hematologic and skin malignancies in patients using topical calcineurin inhibitors although causality is questionable.
Cyclosporine Counseling:  I discussed with the patient the risks of cyclosporine including but not limited to hypertension, gingival hyperplasia,myelosuppression, immunosuppression, liver damage, kidney damage, neurotoxicity, lymphoma, and serious infections. The patient understands that monitoring is required including baseline blood pressure, CBC, CMP, lipid panel and uric acid, and then 1-2 times monthly CMP and blood pressure.
Sarecycline Counseling: Patient advised regarding possible photosensitivity and discoloration of the teeth, skin, lips, tongue and gums.  Patient instructed to avoid sunlight, if possible.  When exposed to sunlight, patients should wear protective clothing, sunglasses, and sunscreen.  The patient was instructed to call the office immediately if the following severe adverse effects occur:  hearing changes, easy bruising/bleeding, severe headache, or vision changes.  The patient verbalized understanding of the proper use and possible adverse effects of sarecycline.  All of the patient's questions and concerns were addressed.
Benzoyl Peroxide Counseling: Patient counseled that medicine may cause skin irritation and bleach clothing.  In the event of skin irritation, the patient was advised to reduce the amount of the drug applied or use it less frequently.   The patient verbalized understanding of the proper use and possible adverse effects of benzoyl peroxide.  All of the patient's questions and concerns were addressed.
Gabapentin Counseling: I discussed with the patient the risks of gabapentin including but not limited to dizziness, somnolence, fatigue and ataxia.
Dutasteride Pregnancy And Lactation Text: This medication is absolutely contraindicated in women, especially during pregnancy and breast feeding. Feminization of male fetuses is possible if taking while pregnant.
Cosentyx Counseling:  I discussed with the patient the risks of Cosentyx including but not limited to worsening of Crohn's disease, immunosuppression, allergic reactions and infections.  The patient understands that monitoring is required including a PPD at baseline and must alert us or the primary physician if symptoms of infection or other concerning signs are noted.
Libtayo Counseling- I discussed with the patient the risks of Libtayo including but not limited to nausea, vomiting, diarrhea, and bone or muscle pain.  The patient verbalized understanding of the proper use and possible adverse effects of Libtayo.  All of the patient's questions and concerns were addressed.
Olumiant Pregnancy And Lactation Text: Based on animal studies, Olumiant may cause embryo-fetal harm when administered to pregnant women.  The medication should not be used in pregnancy.  Breastfeeding is not recommended during treatment.
Xolair Counseling:  Patient informed of potential adverse effects including but not limited to fever, muscle aches, rash and allergic reactions.  The patient verbalized understanding of the proper use and possible adverse effects of Xolair.  All of the patient's questions and concerns were addressed.
Cimzia Counseling:  I discussed with the patient the risks of Cimzia including but not limited to immunosuppression, allergic reactions and infections.  The patient understands that monitoring is required including a PPD at baseline and must alert us or the primary physician if symptoms of infection or other concerning signs are noted.
Azelaic Acid Counseling: Patient counseled that medicine may cause skin irritation and to avoid applying near the eyes.  In the event of skin irritation, the patient was advised to reduce the amount of the drug applied or use it less frequently.   The patient verbalized understanding of the proper use and possible adverse effects of azelaic acid.  All of the patient's questions and concerns were addressed.
Erivedge Counseling- I discussed with the patient the risks of Erivedge including but not limited to nausea, vomiting, diarrhea, constipation, weight loss, changes in the sense of taste, decreased appetite, muscle spasms, and hair loss.  The patient verbalized understanding of the proper use and possible adverse effects of Erivedge.  All of the patient's questions and concerns were addressed.
Glycopyrrolate Pregnancy And Lactation Text: This medication is Pregnancy Category B and is considered safe during pregnancy. It is unknown if it is excreted breast milk.
Topical Clindamycin Counseling: Patient counseled that this medication may cause skin irritation or allergic reactions.  In the event of skin irritation, the patient was advised to reduce the amount of the drug applied or use it less frequently.   The patient verbalized understanding of the proper use and possible adverse effects of clindamycin.  All of the patient's questions and concerns were addressed.
Cellcept Pregnancy And Lactation Text: This medication is Pregnancy Category D and isn't considered safe during pregnancy. It is unknown if this medication is excreted in breast milk.
Bexarotene Counseling:  I discussed with the patient the risks of bexarotene including but not limited to hair loss, dry lips/skin/eyes, liver abnormalities, hyperlipidemia, pancreatitis, depression/suicidal ideation, photosensitivity, drug rash/allergic reactions, hypothyroidism, anemia, leukopenia, infection, cataracts, and teratogenicity.  Patient understands that they will need regular blood tests to check lipid profile, liver function tests, white blood cell count, thyroid function tests and pregnancy test if applicable.
Dapsone Counseling: I discussed with the patient the risks of dapsone including but not limited to hemolytic anemia, agranulocytosis, rashes, methemoglobinemia, kidney failure, peripheral neuropathy, headaches, GI upset, and liver toxicity.  Patients who start dapsone require monitoring including baseline LFTs and weekly CBCs for the first month, then every month thereafter.  The patient verbalized understanding of the proper use and possible adverse effects of dapsone.  All of the patient's questions and concerns were addressed.
Ivermectin Counseling:  Patient instructed to take medication on an empty stomach with a full glass of water.  Patient informed of potential adverse effects including but not limited to nausea, diarrhea, dizziness, itching, and swelling of the extremities or lymph nodes.  The patient verbalized understanding of the proper use and possible adverse effects of ivermectin.  All of the patient's questions and concerns were addressed.
Tranexamic Acid Counseling:  Patient advised of the small risk of bleeding problems with tranexamic acid. They were also instructed to call if they developed any nausea, vomiting or diarrhea. All of the patient's questions and concerns were addressed.
Topical Sulfur Applications Pregnancy And Lactation Text: This medication is Pregnancy Category C and has an unknown safety profile during pregnancy. It is unknown if this topical medication is excreted in breast milk.
Odomzo Counseling- I discussed with the patient the risks of Odomzo including but not limited to nausea, vomiting, diarrhea, constipation, weight loss, changes in the sense of taste, decreased appetite, muscle spasms, and hair loss.  The patient verbalized understanding of the proper use and possible adverse effects of Odomzo.  All of the patient's questions and concerns were addressed.
Stelara Pregnancy And Lactation Text: This medication is Pregnancy Category B and is considered safe during pregnancy. It is unknown if this medication is excreted in breast milk.
Siliq Counseling:  I discussed with the patient the risks of Siliq including but not limited to new or worsening depression, suicidal thoughts and behavior, immunosuppression, malignancy, posterior leukoencephalopathy syndrome, and serious infections.  The patient understands that monitoring is required including a PPD at baseline and must alert us or the primary physician if symptoms of infection or other concerning signs are noted. There is also a special program designed to monitor depression which is required with Siliq.
Dupixent Counseling: I discussed with the patient the risks of dupilumab including but not limited to eye infection and irritation, cold sores, injection site reactions, worsening of asthma, allergic reactions and increased risk of parasitic infection.  Live vaccines should be avoided while taking dupilumab. Dupilumab will also interact with certain medications such as warfarin and cyclosporine. The patient understands that monitoring is required and they must alert us or the primary physician if symptoms of infection or other concerning signs are noted.
Bactrim Pregnancy And Lactation Text: This medication is Pregnancy Category D and is known to cause fetal risk.  It is also excreted in breast milk.
Adbry Counseling: I discussed with the patient the risks of tralokinumab including but not limited to eye infection and irritation, cold sores, injection site reactions, worsening of asthma, allergic reactions and increased risk of parasitic infection.  Live vaccines should be avoided while taking tralokinumab. The patient understands that monitoring is required and they must alert us or the primary physician if symptoms of infection or other concerning signs are noted.
Picato Counseling:  I discussed with the patient the risks of Picato including but not limited to erythema, scaling, itching, weeping, crusting, and pain.
Methotrexate Pregnancy And Lactation Text: This medication is Pregnancy Category X and is known to cause fetal harm. This medication is excreted in breast milk.
Klisyri Pregnancy And Lactation Text: It is unknown if this medication can harm a developing fetus or if it is excreted in breast milk.
Clofazimine Counseling:  I discussed with the patient the risks of clofazimine including but not limited to skin and eye pigmentation, liver damage, nausea/vomiting, gastrointestinal bleeding and allergy.
Acitretin Counseling:  I discussed with the patient the risks of acitretin including but not limited to hair loss, dry lips/skin/eyes, liver damage, hyperlipidemia, depression/suicidal ideation, photosensitivity.  Serious rare side effects can include but are not limited to pancreatitis, pseudotumor cerebri, bony changes, clot formation/stroke/heart attack.  Patient understands that alcohol is contraindicated since it can result in liver toxicity and significantly prolong the elimination of the drug by many years.
Clindamycin Counseling: I counseled the patient regarding use of clindamycin as an antibiotic for prophylactic and/or therapeutic purposes. Clindamycin is active against numerous classes of bacteria, including skin bacteria. Side effects may include nausea, diarrhea, gastrointestinal upset, rash, hives, yeast infections, and in rare cases, colitis.
Eucrisa Counseling: Patient may experience a mild burning sensation during topical application. Eucrisa is not approved in children less than 2 years of age.
Aklief Pregnancy And Lactation Text: It is unknown if this medication is safe to use during pregnancy.  It is unknown if this medication is excreted in breast milk.  Breastfeeding women should use the topical cream on the smallest area of the skin for the shortest time needed while breastfeeding.  Do not apply to nipple and areola.
Terbinafine Counseling: Patient counseling regarding adverse effects of terbinafine including but not limited to headache, diarrhea, rash, upset stomach, liver function test abnormalities, itching, taste/smell disturbance, nausea, abdominal pain, and flatulence.  There is a rare possibility of liver failure that can occur when taking terbinafine.  The patient understands that a baseline LFT and kidney function test may be required. The patient verbalized understanding of the proper use and possible adverse effects of terbinafine.  All of the patient's questions and concerns were addressed.
Topical Retinoid counseling:  Patient advised to apply a pea-sized amount only at bedtime and wait 30 minutes after washing their face before applying.  If too drying, patient may add a non-comedogenic moisturizer. The patient verbalized understanding of the proper use and possible adverse effects of retinoids.  All of the patient's questions and concerns were addressed.
Infliximab Counseling:  I discussed with the patient the risks of infliximab including but not limited to myelosuppression, immunosuppression, autoimmune hepatitis, demyelinating diseases, lymphoma, and serious infections.  The patient understands that monitoring is required including a PPD at baseline and must alert us or the primary physician if symptoms of infection or other concerning signs are noted.
Niacinamide Pregnancy And Lactation Text: These medications are considered safe during pregnancy.
Ilumya Counseling: I discussed with the patient the risks of tildrakizumab including but not limited to immunosuppression, malignancy, posterior leukoencephalopathy syndrome, and serious infections.  The patient understands that monitoring is required including a PPD at baseline and must alert us or the primary physician if symptoms of infection or other concerning signs are noted.
Xolair Pregnancy And Lactation Text: This medication is Pregnancy Category B and is considered safe during pregnancy. This medication is excreted in breast milk.
Olumiant Counseling: I discussed with the patient the risks of Olumiant therapy including but not limited to upper respiratory tract infections, shingles, cold sores, and nausea. Live vaccines should be avoided.  This medication has been linked to serious infections; higher rate of mortality; malignancy and lymphoproliferative disorders; major adverse cardiovascular events; thrombosis; gastrointestinal perforations; neutropenia; lymphopenia; anemia; liver enzyme elevations; and lipid elevations.
Griseofulvin Counseling:  I discussed with the patient the risks of griseofulvin including but not limited to photosensitivity, cytopenia, liver damage, nausea/vomiting and severe allergy.  The patient understands that this medication is best absorbed when taken with a fatty meal (e.g., ice cream or french fries).
Benzoyl Peroxide Pregnancy And Lactation Text: This medication is Pregnancy Category C. It is unknown if benzoyl peroxide is excreted in breast milk.
Humira Counseling:  I discussed with the patient the risks of adalimumab including but not limited to myelosuppression, immunosuppression, autoimmune hepatitis, demyelinating diseases, lymphoma, and serious infections.  The patient understands that monitoring is required including a PPD at baseline and must alert us or the primary physician if symptoms of infection or other concerning signs are noted.
Prednisone Pregnancy And Lactation Text: This medication is Pregnancy Category C and it isn't know if it is safe during pregnancy. This medication is excreted in breast milk.
Azathioprine Counseling:  I discussed with the patient the risks of azathioprine including but not limited to myelosuppression, immunosuppression, hepatotoxicity, lymphoma, and infections.  The patient understands that monitoring is required including baseline LFTs, Creatinine, possible TPMP genotyping and weekly CBCs for the first month and then every 2 weeks thereafter.  The patient verbalized understanding of the proper use and possible adverse effects of azathioprine.  All of the patient's questions and concerns were addressed.
Drysol Pregnancy And Lactation Text: This medication is considered safe during pregnancy and breast feeding.
Cibinqo Pregnancy And Lactation Text: It is unknown if this medication will adversely affect pregnancy or breast feeding.  You should not take this medication if you are currently pregnant or planning a pregnancy or while breastfeeding.
Stelara Counseling:  I discussed with the patient the risks of ustekinumab including but not limited to immunosuppression, malignancy, posterior leukoencephalopathy syndrome, and serious infections.  The patient understands that monitoring is required including a PPD at baseline and must alert us or the primary physician if symptoms of infection or other concerning signs are noted.
Finasteride Male Counseling: Finasteride Counseling:  I discussed with the patient the risks of use of finasteride including but not limited to decreased libido, decreased ejaculate volume, gynecomastia, and depression. Women should not handle medication.  All of the patient's questions and concerns were addressed.
Birth Control Pills Counseling: Birth Control Pill Counseling: I discussed with the patient the potential side effects of OCPs including but not limited to increased risk of stroke, heart attack, thrombophlebitis, deep venous thrombosis, hepatic adenomas, breast changes, GI upset, headaches, and depression.  The patient verbalized understanding of the proper use and possible adverse effects of OCPs. All of the patient's questions and concerns were addressed.
Qbrexza Counseling:  I discussed with the patient the risks of Qbrexza including but not limited to headache, mydriasis, blurred vision, dry eyes, nasal dryness, dry mouth, dry throat, dry skin, urinary hesitation, and constipation.  Local skin reactions including erythema, burning, stinging, and itching can also occur.
Propranolol Counseling:  I discussed with the patient the risks of propranolol including but not limited to low heart rate, low blood pressure, low blood sugar, restlessness and increased cold sensitivity. They should call the office if they experience any of these side effects.
Winlevi Pregnancy And Lactation Text: This medication is considered safe during pregnancy and breastfeeding.
Zyclara Counseling:  I discussed with the patient the risks of imiquimod including but not limited to erythema, scaling, itching, weeping, crusting, and pain.  Patient understands that the inflammatory response to imiquimod is variable from person to person and was educated regarded proper titration schedule.  If flu-like symptoms develop, patient knows to discontinue the medication and contact us.
Cephalexin Pregnancy And Lactation Text: This medication is Pregnancy Category B and considered safe during pregnancy.  It is also excreted in breast milk but can be used safely for shorter doses.
Solaraze Pregnancy And Lactation Text: This medication is Pregnancy Category B and is considered safe. There is some data to suggest avoiding during the third trimester. It is unknown if this medication is excreted in breast milk.
Topical Sulfur Applications Counseling: Topical Sulfur Counseling: Patient counseled that this medication may cause skin irritation or allergic reactions.  In the event of skin irritation, the patient was advised to reduce the amount of the drug applied or use it less frequently.   The patient verbalized understanding of the proper use and possible adverse effects of topical sulfur application.  All of the patient's questions and concerns were addressed.
High Dose Vitamin A Pregnancy And Lactation Text: High dose vitamin A therapy is contraindicated during pregnancy and breast feeding.
Protopic Pregnancy And Lactation Text: This medication is Pregnancy Category C. It is unknown if this medication is excreted in breast milk when applied topically.
Carac Pregnancy And Lactation Text: This medication is Pregnancy Category X and contraindicated in pregnancy and in women who may become pregnant. It is unknown if this medication is excreted in breast milk.
Opioid Counseling: I discussed with the patient the potential side effects of opioids including but not limited to addiction, altered mental status, and depression. I stressed avoiding alcohol, benzodiazepines, muscle relaxants and sleep aids unless specifically okayed by a physician. The patient verbalized understanding of the proper use and possible adverse effects of opioids. All of the patient's questions and concerns were addressed. They were instructed to flush the remaining pills down the toilet if they did not need them for pain.
Fluconazole Counseling:  Patient counseled regarding adverse effects of fluconazole including but not limited to headache, diarrhea, nausea, upset stomach, liver function test abnormalities, taste disturbance, and stomach pain.  There is a rare possibility of liver failure that can occur when taking fluconazole.  The patient understands that monitoring of LFTs and kidney function test may be required, especially at baseline. The patient verbalized understanding of the proper use and possible adverse effects of fluconazole.  All of the patient's questions and concerns were addressed.
Dapsone Pregnancy And Lactation Text: This medication is Pregnancy Category C and is not considered safe during pregnancy or breast feeding.
Bactrim Counseling:  I discussed with the patient the risks of sulfa antibiotics including but not limited to GI upset, allergic reaction, drug rash, diarrhea, dizziness, photosensitivity, and yeast infections.  Rarely, more serious reactions can occur including but not limited to aplastic anemia, agranulocytosis, methemoglobinemia, blood dyscrasias, liver or kidney failure, lung infiltrates or desquamative/blistering drug rashes.
Cyclophosphamide Pregnancy And Lactation Text: This medication is Pregnancy Category D and it isn't considered safe during pregnancy. This medication is excreted in breast milk.
Detail Level: Simple
Cibinqo Counseling: I discussed with the patient the risks of Cibinqo therapy including but not limited to common cold, nausea, headache, cold sores, increased blood CPK levels, dizziness, UTIs, fatigue, acne, and vomitting. Live vaccines should be avoided.  This medication has been linked to serious infections; higher rate of mortality; malignancy and lymphoproliferative disorders; major adverse cardiovascular events; thrombosis; thrombocytopenia and lymphopenia; lipid elevations; and retinal detachment.
Ketoconazole Counseling:   Patient counseled regarding improving absorption with orange juice.  Adverse effects include but are not limited to breast enlargement, headache, diarrhea, nausea, upset stomach, liver function test abnormalities, taste disturbance, and stomach pain.  There is a rare possibility of liver failure that can occur when taking ketoconazole. The patient understands that monitoring of LFTs may be required, especially at baseline. The patient verbalized understanding of the proper use and possible adverse effects of ketoconazole.  All of the patient's questions and concerns were addressed.
Rhofade Pregnancy And Lactation Text: This medication has not been assigned a Pregnancy Risk Category. It is unknown if the medication is excreted in breast milk.
Azithromycin Pregnancy And Lactation Text: This medication is considered safe during pregnancy and is also secreted in breast milk.
Arava Counseling:  Patient counseled regarding adverse effects of Arava including but not limited to nausea, vomiting, abnormalities in liver function tests. Patients may develop mouth sores, rash, diarrhea, and abnormalities in blood counts. The patient understands that monitoring is required including LFTs and blood counts.  There is a rare possibility of scarring of the liver and lung problems that can occur when taking methotrexate. Persistent nausea, loss of appetite, pale stools, dark urine, cough, and shortness of breath should be reported immediately. Patient advised to discontinue Arava treatment and consult with a physician prior to attempting conception. The patient will have to undergo a treatment to eliminate Arava from the body prior to conception.
Cimetidine Counseling:  I discussed with the patient the risks of Cimetidine including but not limited to gynecomastia, headache, diarrhea, nausea, drowsiness, arrhythmias, pancreatitis, skin rashes, psychosis, bone marrow suppression and kidney toxicity.
Glycopyrrolate Counseling:  I discussed with the patient the risks of glycopyrrolate including but not limited to skin rash, drowsiness, dry mouth, difficulty urinating, and blurred vision.
Opzelura Counseling:  I discussed with the patient the risks of Opzelura including but not limited to nasopharngitis, bronchitis, ear infection, eosinophila, hives, diarrhea, folliculitis, tonsillitis, and rhinorrhea.  Taken orally, this medication has been linked to serious infections; higher rate of mortality; malignancy and lymphoproliferative disorders; major adverse cardiovascular events; thrombosis; thrombocytopenia, anemia, and neutropenia; and lipid elevations.
Hydroxychloroquine Counseling:  I discussed with the patient that a baseline ophthalmologic exam is needed at the start of therapy and every year thereafter while on therapy. A CBC may also be warranted for monitoring.  The side effects of this medication were discussed with the patient, including but not limited to agranulocytosis, aplastic anemia, seizures, rashes, retinopathy, and liver toxicity. Patient instructed to call the office should any adverse effect occur.  The patient verbalized understanding of the proper use and possible adverse effects of Plaquenil.  All the patient's questions and concerns were addressed.
Rifampin Pregnancy And Lactation Text: This medication is Pregnancy Category C and it isn't know if it is safe during pregnancy. It is also excreted in breast milk and should not be used if you are breast feeding.
Tetracycline Counseling: Patient counseled regarding possible photosensitivity and increased risk for sunburn.  Patient instructed to avoid sunlight, if possible.  When exposed to sunlight, patients should wear protective clothing, sunglasses, and sunscreen.  The patient was instructed to call the office immediately if the following severe adverse effects occur:  hearing changes, easy bruising/bleeding, severe headache, or vision changes.  The patient verbalized understanding of the proper use and possible adverse effects of tetracycline.  All of the patient's questions and concerns were addressed. Patient understands to avoid pregnancy while on therapy due to potential birth defects.
Hydroquinone Pregnancy And Lactation Text: This medication has not been assigned a Pregnancy Risk Category but animal studies failed to show danger with the topical medication. It is unknown if the medication is excreted in breast milk.
Tremfya Counseling: I discussed with the patient the risks of guselkumab including but not limited to immunosuppression, serious infections, worsening of inflammatory bowel disease and drug reactions.  The patient understands that monitoring is required including a PPD at baseline and must alert us or the primary physician if symptoms of infection or other concerning signs are noted.
Rhofade Counseling: Rhofade is a topical medication which can decrease superficial blood flow where applied. Side effects are uncommon and include stinging, redness and allergic reactions.
Opioid Pregnancy And Lactation Text: These medications can lead to premature delivery and should be avoided during pregnancy. These medications are also present in breast milk in small amounts.
Rifampin Counseling: I discussed with the patient the risks of rifampin including but not limited to liver damage, kidney damage, red-orange body fluids, nausea/vomiting and severe allergy.
Oral Minoxidil Counseling- I discussed with the patient the risks of oral minoxidil including but not limited to shortness of breath, swelling of the feet or ankles, dizziness, lightheadedness, unwanted hair growth and allergic reaction.  The patient verbalized understanding of the proper use and possible adverse effects of oral minoxidil.  All of the patient's questions and concerns were addressed.
Cantharidin Pregnancy And Lactation Text: The use of this medication during pregnancy or lactation is not recommended as there is insufficient data.
SSKI Counseling:  I discussed with the patient the risks of SSKI including but not limited to thyroid abnormalities, metallic taste, GI upset, fever, headache, acne, arthralgias, paraesthesias, lymphadenopathy, easy bleeding, arrhythmias, and allergic reaction.
Quinolones Counseling:  I discussed with the patient the risks of fluoroquinolones including but not limited to GI upset, allergic reaction, drug rash, diarrhea, dizziness, photosensitivity, yeast infections, liver function test abnormalities, tendonitis/tendon rupture.
Imiquimod Counseling:  I discussed with the patient the risks of imiquimod including but not limited to erythema, scaling, itching, weeping, crusting, and pain.  Patient understands that the inflammatory response to imiquimod is variable from person to person and was educated regarded proper titration schedule.  If flu-like symptoms develop, patient knows to discontinue the medication and contact us.
Clindamycin Pregnancy And Lactation Text: This medication can be used in pregnancy if certain situations. Clindamycin is also present in breast milk.
Klisyri Counseling:  I discussed with the patient the risks of Klisyri including but not limited to erythema, scaling, itching, weeping, crusting, and pain.
Albendazole Counseling:  I discussed with the patient the risks of albendazole including but not limited to cytopenia, kidney damage, nausea/vomiting and severe allergy.  The patient understands that this medication is being used in an off-label manner.
Solaraze Counseling:  I discussed with the patient the risks of Solaraze including but not limited to erythema, scaling, itching, weeping, crusting, and pain.
Qbrexza Pregnancy And Lactation Text: There is no available data on Qbrexza use in pregnant women.  There is no available data on Qbrexza use in lactation.
Valtrex Pregnancy And Lactation Text: this medication is Pregnancy Category B and is considered safe during pregnancy. This medication is not directly found in breast milk but it's metabolite acyclovir is present.
Xelfayz Pregnancy And Lactation Text: This medication is Pregnancy Category D and is not considered safe during pregnancy.  The risk during breast feeding is also uncertain.
Niacinamide Counseling: I recommended taking niacin or niacinamide, also know as vitamin B3, twice daily. Recent evidence suggests that taking vitamin B3 (500 mg twice daily) can reduce the risk of actinic keratoses and non-melanoma skin cancers. Side effects of vitamin B3 include flushing and headache.
Hydroxyzine Counseling: Patient advised that the medication is sedating and not to drive a car after taking this medication.  Patient informed of potential adverse effects including but not limited to dry mouth, urinary retention, and blurry vision.  The patient verbalized understanding of the proper use and possible adverse effects of hydroxyzine.  All of the patient's questions and concerns were addressed.
Tazorac Counseling:  Patient advised that medication is irritating and drying.  Patient may need to apply sparingly and wash off after an hour before eventually leaving it on overnight.  The patient verbalized understanding of the proper use and possible adverse effects of tazorac.  All of the patient's questions and concerns were addressed.
Rituxan Counseling:  I discussed with the patient the risks of Rituxan infusions. Side effects can include infusion reactions, severe drug rashes including mucocutaneous reactions, reactivation of latent hepatitis and other infections and rarely progressive multifocal leukoencephalopathy.  All of the patient's questions and concerns were addressed.
Metronidazole Pregnancy And Lactation Text: This medication is Pregnancy Category B and considered safe during pregnancy.  It is also excreted in breast milk.
Isotretinoin Counseling: Patient should get monthly blood tests, not donate blood, not drive at night if vision affected, not share medication, and not undergo elective surgery for 6 months after tx completed. Side effects reviewed, pt to contact office should one occur.
Opzelura Pregnancy And Lactation Text: There is insufficient data to evaluate drug-associated risk for major birth defects, miscarriage, or other adverse maternal or fetal outcomes.  There is a pregnancy registry that monitors pregnancy outcomes in pregnant persons exposed to the medication during pregnancy.  It is unknown if this medication is excreted in breast milk.  Do not breastfeed during treatment and for about 4 weeks after the last dose.
Otezla Counseling: The side effects of Otezla were discussed with the patient, including but not limited to worsening or new depression, weight loss, diarrhea, nausea, upper respiratory tract infection, and headache. Patient instructed to call the office should any adverse effect occur.  The patient verbalized understanding of the proper use and possible adverse effects of Otezla.  All the patient's questions and concerns were addressed.
Topical Ketoconazole Counseling: Patient counseled that this medication may cause skin irritation or allergic reactions.  In the event of skin irritation, the patient was advised to reduce the amount of the drug applied or use it less frequently.   The patient verbalized understanding of the proper use and possible adverse effects of ketoconazole.  All of the patient's questions and concerns were addressed.
Elidel Counseling: Patient may experience a mild burning sensation during topical application. Elidel is not approved in children less than 2 years of age. There have been case reports of hematologic and skin malignancies in patients using topical calcineurin inhibitors although causality is questionable.
Propranolol Pregnancy And Lactation Text: This medication is Pregnancy Category C and it isn't known if it is safe during pregnancy. It is excreted in breast milk.
High Dose Vitamin A Counseling: Side effects reviewed, pt to contact office should one occur.
Xeljanz Counseling: I discussed with the patient the risks of Xeljanz therapy including increased risk of infection, liver issues, headache, diarrhea, or cold symptoms. Live vaccines should be avoided. They were instructed to call if they have any problems.
Cephalexin Counseling: I counseled the patient regarding use of cephalexin as an antibiotic for prophylactic and/or therapeutic purposes. Cephalexin (commonly prescribed under brand name Keflex) is a cephalosporin antibiotic which is active against numerous classes of bacteria, including most skin bacteria. Side effects may include nausea, diarrhea, gastrointestinal upset, rash, hives, yeast infections, and in rare cases, hepatitis, kidney disease, seizures, fever, confusion, neurologic symptoms, and others. Patients with severe allergies to penicillin medications are cautioned that there is about a 10% incidence of cross-reactivity with cephalosporins. When possible, patients with penicillin allergies should use alternatives to cephalosporins for antibiotic therapy.
Doxepin Counseling:  Patient advised that the medication is sedating and not to drive a car after taking this medication. Patient informed of potential adverse effects including but not limited to dry mouth, urinary retention, and blurry vision.  The patient verbalized understanding of the proper use and possible adverse effects of doxepin.  All of the patient's questions and concerns were addressed.
Isotretinoin Pregnancy And Lactation Text: This medication is Pregnancy Category X and is considered extremely dangerous during pregnancy. It is unknown if it is excreted in breast milk.
Minocycline Counseling: Patient advised regarding possible photosensitivity and discoloration of the teeth, skin, lips, tongue and gums.  Patient instructed to avoid sunlight, if possible.  When exposed to sunlight, patients should wear protective clothing, sunglasses, and sunscreen.  The patient was instructed to call the office immediately if the following severe adverse effects occur:  hearing changes, easy bruising/bleeding, severe headache, or vision changes.  The patient verbalized understanding of the proper use and possible adverse effects of minocycline.  All of the patient's questions and concerns were addressed.
Skyrizi Counseling: I discussed with the patient the risks of risankizumab-rzaa including but not limited to immunosuppression, and serious infections.  The patient understands that monitoring is required including a PPD at baseline and must alert us or the primary physician if symptoms of infection or other concerning signs are noted.
Thalidomide Counseling: I discussed with the patient the risks of thalidomide including but not limited to birth defects, anxiety, weakness, chest pain, dizziness, cough and severe allergy.
Aklief counseling:  Patient advised to apply a pea-sized amount only at bedtime and wait 30 minutes after washing their face before applying.  If too drying, patient may add a non-comedogenic moisturizer.  The most commonly reported side effects including irritation, redness, scaling, dryness, stinging, burning, itching, and increased risk of sunburn.  The patient verbalized understanding of the proper use and possible adverse effects of retinoids.  All of the patient's questions and concerns were addressed.
Hydroquinone Counseling:  Patient advised that medication may result in skin irritation, lightening (hypopigmentation), dryness, and burning.  In the event of skin irritation, the patient was advised to reduce the amount of the drug applied or use it less frequently.  Rarely, spots that are treated with hydroquinone can become darker (pseudoochronosis).  Should this occur, patient instructed to stop medication and call the office. The patient verbalized understanding of the proper use and possible adverse effects of hydroquinone.  All of the patient's questions and concerns were addressed.
Colchicine Counseling:  Patient counseled regarding adverse effects including but not limited to stomach upset (nausea, vomiting, stomach pain, or diarrhea).  Patient instructed to limit alcohol consumption while taking this medication.  Colchicine may reduce blood counts especially with prolonged use.  The patient understands that monitoring of kidney function and blood counts may be required, especially at baseline. The patient verbalized understanding of the proper use and possible adverse effects of colchicine.  All of the patient's questions and concerns were addressed.
Nsaids Counseling: NSAID Counseling: I discussed with the patient that NSAIDs should be taken with food. Prolonged use of NSAIDs can result in the development of stomach ulcers.  Patient advised to stop taking NSAIDs if abdominal pain occurs.  The patient verbalized understanding of the proper use and possible adverse effects of NSAIDs.  All of the patient's questions and concerns were addressed.
Hydroxyzine Pregnancy And Lactation Text: This medication is not safe during pregnancy and should not be taken. It is also excreted in breast milk and breast feeding isn't recommended.
Wartpeel Counseling:  I discussed with the patient the risks of Wartpeel including but not limited to erythema, scaling, itching, weeping, crusting, and pain.
Finasteride Pregnancy And Lactation Text: This medication is absolutely contraindicated during pregnancy. It is unknown if it is excreted in breast milk.
Cimzia Pregnancy And Lactation Text: This medication crosses the placenta but can be considered safe in certain situations. Cimzia may be excreted in breast milk.
Prednisone Counseling:  I discussed with the patient the risks of prolonged use of prednisone including but not limited to weight gain, insomnia, osteoporosis, mood changes, diabetes, susceptibility to infection, glaucoma and high blood pressure.  In cases where prednisone use is prolonged, patients should be monitored with blood pressure checks, serum glucose levels and an eye exam.  Additionally, the patient may need to be placed on GI prophylaxis, PCP prophylaxis, and calcium and vitamin D supplementation and/or a bisphosphonate.  The patient verbalized understanding of the proper use and the possible adverse effects of prednisone.  All of the patient's questions and concerns were addressed.
Otezla Pregnancy And Lactation Text: This medication is Pregnancy Category C and it isn't known if it is safe during pregnancy. It is unknown if it is excreted in breast milk.
Spironolactone Pregnancy And Lactation Text: This medication can cause feminization of the male fetus and should be avoided during pregnancy. The active metabolite is also found in breast milk.
Itraconazole Counseling:  I discussed with the patient the risks of itraconazole including but not limited to liver damage, nausea/vomiting, neuropathy, and severe allergy.  The patient understands that this medication is best absorbed when taken with acidic beverages such as non-diet cola or ginger ale.  The patient understands that monitoring is required including baseline LFTs and repeat LFTs at intervals.  The patient understands that they are to contact us or the primary physician if concerning signs are noted.
Metronidazole Counseling:  I discussed with the patient the risks of metronidazole including but not limited to seizures, nausea/vomiting, a metallic taste in the mouth, nausea/vomiting and severe allergy.
Dutasteride Male Counseling: Dustasteride Counseling:  I discussed with the patient the risks of use of dutasteride including but not limited to decreased libido, decreased ejaculate volume, and gynecomastia. Women who can become pregnant should not handle medication.  All of the patient's questions and concerns were addressed.
Ketoconazole Pregnancy And Lactation Text: This medication is Pregnancy Category C and it isn't know if it is safe during pregnancy. It is also excreted in breast milk and breast feeding isn't recommended.
5-Fu Counseling: 5-Fluorouracil Counseling:  I discussed with the patient the risks of 5-fluorouracil including but not limited to erythema, scaling, itching, weeping, crusting, and pain.

## 2022-09-16 NOTE — PROCEDURE: TREATMENT REGIMEN
Detail Level: Simple
Initiate Treatment: ketoconazole 2 % topical cream - Apply to affected area of groin twice a day for 2-4 weeks\\n\\nhydrocortisone 2.5 % topical cream - Apply to affected area on the groin twice a day for 2 weeks.
Plan: Discussed with patient that condition may flare again so he can repeat treatment as needed. Will assess treatment plan when based upon bacterial culture results.

## 2022-09-16 NOTE — PROCEDURE: DIAGNOSIS COMMENT
Comment: Patient reports rash began 4 days ago. He has used over the counter hydrocortisone for 2 days and finds that it has calmed the irritation slightly.
Detail Level: Simple
Render Risk Assessment In Note?: no

## 2022-10-29 ENCOUNTER — HEALTH MAINTENANCE LETTER (OUTPATIENT)
Age: 72
End: 2022-10-29

## 2023-06-24 ENCOUNTER — HEALTH MAINTENANCE LETTER (OUTPATIENT)
Age: 73
End: 2023-06-24

## 2023-07-07 ENCOUNTER — MYC MEDICAL ADVICE (OUTPATIENT)
Dept: FAMILY MEDICINE | Facility: CLINIC | Age: 73
End: 2023-07-07

## 2024-08-17 ENCOUNTER — HEALTH MAINTENANCE LETTER (OUTPATIENT)
Age: 74
End: 2024-08-17

## 2024-09-25 ENCOUNTER — TELEPHONE (OUTPATIENT)
Dept: FAMILY MEDICINE | Facility: CLINIC | Age: 74
End: 2024-09-25

## 2024-09-25 NOTE — TELEPHONE ENCOUNTER
Sandi, daughter, called regarding Maite.  She reports rapid muscle deterioration in his legs and is following with a neurologist in [facility] who feels this is either a motor neuron disorder or ALS.  Sandi states more tests have been ordered such as another EMG, spinal tap, etc.  They are wanting to see Dr. Rangel for a second opinion to see if the [facility] or the [place] would be another option for evaluation.  I recommended the [facility] as they have some of the best Neuromuscular providers in the nation; Dr. Roche, Dr. Young, Dr. Abreu and Dr. Gonzáles.  Scheduled Maite to see Dr. Rangel on 10/9/24 @ 10:30 am.  JOSSELINE ZacariasN, RN, [facility]  RN Care Coordinator  [facility]  09/25/24  4:35 PM  Phone: [phone]

## 2025-04-17 NOTE — PROGRESS NOTES
In an effort to ensure that our patients [employer]Well, a Team Member has reviewed your chart and identified an opportunity to provide the best care possible. An attempt was made to discuss or schedule due or overdue Preventive or Chronic Condition care.Care Gaps identified: Hypertension.    The Outcome was Contact was not made, left message. We are attempting to schedule a nurse visit. If you have any questions or need help with scheduling, contact your primary care provider..   Type of Appointment needed: Follow-up Visit    Mr. Hale is a 66 year old male who presents for follow up from cataract surgery right eye.  Now s/p YAG capsulotomy right eye     Haloes have improved but vision subjectively still not perfect    Ocular history:  Cataract extraction with posterior chamber intraocular lens (PCIOL) 10/8/15    A/P:  1. Pseudophakia, right eye     Doing well post YAG - corrects to 20/20+    2. Epiretinal membrane right eye, trace - may be explanation for ongoing visual dissatisfaction   - observe for now    3. Presurgical cataract left eye - observe    Return to clinic 1 year earlier as needed       ~~~~~~~~~~~~~~~~~~~~~~~~~~~~~~~~~~~~~~~~~~~~~~~~~~~~~~~~~~~~~~~~    Complete documentation of historical and exam elements from today's encounter can be found in the full encounter summary report (not reduplicated in this progress note). I personally obtained the chief complaint(s) and history of present illness.  I confirmed and edited as necessary the review of systems, past medical/surgical history, family history, social history, and examination findings as documented by others.  I examined the patient myself, and I personally reviewed the relevant tests, images, and reports as documented above. I formulated and edited as necessary the assessment and plan and discussed the findings and management plan with the patient and family.     Tyler Damian MD, MA  Director, Cornea & Anterior Segment  [facility] Department of Ophthalmology & Visual Neuroscience

## 2025-05-05 NOTE — NURSING NOTE
As we discussed, I do think that you are stable to follow in the outpatient setting, and do take the Atarax that we have given you to help with any itching that you have.  Please discuss with the pharmacist if you are allergic to this medication or not before taking.  Please continue to follow with your dermatologist, and come back to the ER immediately with any other concerns you have or any new symptoms.   Chief Complaints and History of Present Illnesses   Patient presents with     Follow Up     Chief Complaint(s) and History of Present Illness(es)     Follow Up     Laterality: both eyes    Associated symptoms: Negative for flashes, floaters, eye pain and double vision    Treatments tried: no treatments    Pain scale: 0/10              Comments     ERM follow up  Eyes don't feel balanced when looking at near at times?  Bebe Dominguez COA 2:56 PM December 11, 2019

## (undated) RX ORDER — FENTANYL CITRATE 50 UG/ML
INJECTION, SOLUTION INTRAMUSCULAR; INTRAVENOUS
Status: DISPENSED
Start: 2022-07-12